# Patient Record
Sex: FEMALE | Race: WHITE | NOT HISPANIC OR LATINO | Employment: FULL TIME | URBAN - METROPOLITAN AREA
[De-identification: names, ages, dates, MRNs, and addresses within clinical notes are randomized per-mention and may not be internally consistent; named-entity substitution may affect disease eponyms.]

---

## 2020-01-22 ENCOUNTER — OFFICE VISIT - HEALTHEAST (OUTPATIENT)
Dept: FAMILY MEDICINE | Facility: CLINIC | Age: 75
End: 2020-01-22

## 2020-01-22 DIAGNOSIS — D68.51 FACTOR V LEIDEN (H): ICD-10-CM
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
## 2020-01-22 DIAGNOSIS — Z23 NEED FOR TETANUS BOOSTER: ICD-10-CM

## 2020-01-22 DIAGNOSIS — R53.82 CHRONIC FATIGUE: ICD-10-CM

## 2020-01-22 DIAGNOSIS — R07.89 ATYPICAL CHEST PAIN: ICD-10-CM

## 2020-01-22 LAB
ALBUMIN SERPL-MCNC: 3.9 G/DL (ref 3.5–5)
ALP SERPL-CCNC: 82 U/L (ref 45–120)
ALT SERPL W P-5'-P-CCNC: 12 U/L (ref 0–45)
ANION GAP SERPL CALCULATED.3IONS-SCNC: 11 MMOL/L (ref 5–18)
AST SERPL W P-5'-P-CCNC: 13 U/L (ref 0–40)
ATRIAL RATE - MUSE: 60 BPM
BASOPHILS # BLD AUTO: 0.1 THOU/UL (ref 0–0.2)
BASOPHILS NFR BLD AUTO: 1 % (ref 0–2)
BILIRUB SERPL-MCNC: 0.3 MG/DL (ref 0–1)
BUN SERPL-MCNC: 39 MG/DL (ref 8–28)
C REACTIVE PROTEIN LHE: 0.3 MG/DL (ref 0–0.8)
CALCIUM SERPL-MCNC: 10 MG/DL (ref 8.5–10.5)
CHLORIDE BLD-SCNC: 106 MMOL/L (ref 98–107)
CK SERPL-CCNC: 48 U/L (ref 30–190)
CO2 SERPL-SCNC: 24 MMOL/L (ref 22–31)
CREAT SERPL-MCNC: 1.35 MG/DL (ref 0.6–1.1)
DIASTOLIC BLOOD PRESSURE - MUSE: NORMAL
EOSINOPHIL # BLD AUTO: 0.2 THOU/UL (ref 0–0.4)
EOSINOPHIL NFR BLD AUTO: 1 % (ref 0–6)
ERYTHROCYTE [DISTWIDTH] IN BLOOD BY AUTOMATED COUNT: 11.4 % (ref 11–14.5)
ERYTHROCYTE [SEDIMENTATION RATE] IN BLOOD BY WESTERGREN METHOD: 12 MM/HR (ref 0–20)
GFR SERPL CREATININE-BSD FRML MDRD: 39 ML/MIN/1.73M2
GLUCOSE BLD-MCNC: 93 MG/DL (ref 70–125)
HCT VFR BLD AUTO: 43.9 % (ref 35–47)
HGB BLD-MCNC: 14.7 G/DL (ref 12–16)
INTERPRETATION ECG - MUSE: NORMAL
LYMPHOCYTES # BLD AUTO: 1.8 THOU/UL (ref 0.8–4.4)
LYMPHOCYTES NFR BLD AUTO: 15 % (ref 20–40)
MCH RBC QN AUTO: 33.8 PG (ref 27–34)
MCHC RBC AUTO-ENTMCNC: 33.5 G/DL (ref 32–36)
MCV RBC AUTO: 101 FL (ref 80–100)
MONOCYTES # BLD AUTO: 0.8 THOU/UL (ref 0–0.9)
MONOCYTES NFR BLD AUTO: 7 % (ref 2–10)
NEUTROPHILS # BLD AUTO: 8.7 THOU/UL (ref 2–7.7)
NEUTROPHILS NFR BLD AUTO: 76 % (ref 50–70)
P AXIS - MUSE: 54 DEGREES
PLATELET # BLD AUTO: 210 THOU/UL (ref 140–440)
PMV BLD AUTO: 8.5 FL (ref 7–10)
POTASSIUM BLD-SCNC: 4.7 MMOL/L (ref 3.5–5)
PR INTERVAL - MUSE: 150 MS
PROT SERPL-MCNC: 7.1 G/DL (ref 6–8)
QRS DURATION - MUSE: 90 MS
QT - MUSE: 412 MS
QTC - MUSE: 412 MS
R AXIS - MUSE: -49 DEGREES
RBC # BLD AUTO: 4.36 MILL/UL (ref 3.8–5.4)
SODIUM SERPL-SCNC: 141 MMOL/L (ref 136–145)
SYSTOLIC BLOOD PRESSURE - MUSE: NORMAL
T AXIS - MUSE: 7 DEGREES
TROPONIN I SERPL-MCNC: <0.01 NG/ML (ref 0–0.29)
TSH SERPL DL<=0.005 MIU/L-ACNC: 0.24 UIU/ML (ref 0.3–5)
VENTRICULAR RATE- MUSE: 60 BPM
WBC: 11.5 THOU/UL (ref 4–11)

## 2020-01-22 RX ORDER — ASPIRIN 325 MG
325 TABLET ORAL
Status: SHIPPED | COMMUNITY
Start: 2013-04-11

## 2020-01-22 RX ORDER — PREDNISONE 10 MG/1
TABLET ORAL
Qty: 42 TABLET | Refills: 0 | Status: SHIPPED | OUTPATIENT
Start: 2020-01-22 | End: 2021-12-03

## 2020-01-22 ASSESSMENT — MIFFLIN-ST. JEOR: SCORE: 1377.71

## 2020-01-23 LAB — B BURGDOR IGG+IGM SER QL: 0.1 INDEX VALUE

## 2020-01-28 ENCOUNTER — COMMUNICATION - HEALTHEAST (OUTPATIENT)
Dept: FAMILY MEDICINE | Facility: CLINIC | Age: 75
End: 2020-01-28

## 2020-07-09 ENCOUNTER — COMMUNICATION - HEALTHEAST (OUTPATIENT)
Dept: FAMILY MEDICINE | Facility: CLINIC | Age: 75
End: 2020-07-09

## 2020-07-29 ENCOUNTER — OFFICE VISIT - HEALTHEAST (OUTPATIENT)
Dept: FAMILY MEDICINE | Facility: CLINIC | Age: 75
End: 2020-07-29

## 2020-07-29 DIAGNOSIS — K46.9 NON-RECURRENT ABDOMINAL HERNIA WITHOUT OBSTRUCTION OR GANGRENE, UNSPECIFIED HERNIA TYPE: ICD-10-CM

## 2020-07-29 RX ORDER — GABAPENTIN 100 MG/1
100 CAPSULE ORAL DAILY PRN
Status: SHIPPED | COMMUNITY
Start: 2020-07-29 | End: 2021-12-03

## 2020-07-29 RX ORDER — MAGNESIUM OXIDE 400 MG/1
400 TABLET ORAL DAILY
Status: SHIPPED | COMMUNITY
Start: 2020-07-29

## 2020-08-03 ENCOUNTER — OFFICE VISIT - HEALTHEAST (OUTPATIENT)
Dept: SURGERY | Facility: CLINIC | Age: 75
End: 2020-08-03

## 2020-08-03 DIAGNOSIS — R10.9 ABDOMINAL WALL PAIN: ICD-10-CM

## 2020-08-04 ENCOUNTER — AMBULATORY - HEALTHEAST (OUTPATIENT)
Dept: SURGERY | Facility: CLINIC | Age: 75
End: 2020-08-04

## 2020-08-04 DIAGNOSIS — K42.9 UMBILICAL HERNIA WITHOUT OBSTRUCTION AND WITHOUT GANGRENE: ICD-10-CM

## 2020-08-12 ENCOUNTER — COMMUNICATION - HEALTHEAST (OUTPATIENT)
Dept: SURGERY | Facility: CLINIC | Age: 75
End: 2020-08-12

## 2020-10-06 ENCOUNTER — AMBULATORY - HEALTHEAST (OUTPATIENT)
Dept: FAMILY MEDICINE | Facility: CLINIC | Age: 75
End: 2020-10-06

## 2020-10-06 DIAGNOSIS — Z12.31 VISIT FOR SCREENING MAMMOGRAM: ICD-10-CM

## 2021-05-27 VITALS — SYSTOLIC BLOOD PRESSURE: 150 MMHG | DIASTOLIC BLOOD PRESSURE: 84 MMHG

## 2021-06-01 ENCOUNTER — RECORDS - HEALTHEAST (OUTPATIENT)
Dept: ADMINISTRATIVE | Facility: CLINIC | Age: 76
End: 2021-06-01

## 2021-06-04 VITALS
WEIGHT: 220.8 LBS | HEART RATE: 64 BPM | SYSTOLIC BLOOD PRESSURE: 166 MMHG | OXYGEN SATURATION: 97 % | DIASTOLIC BLOOD PRESSURE: 94 MMHG | RESPIRATION RATE: 23 BRPM | BODY MASS INDEX: 42.23 KG/M2 | TEMPERATURE: 98.5 F

## 2021-06-04 VITALS
SYSTOLIC BLOOD PRESSURE: 130 MMHG | RESPIRATION RATE: 16 BRPM | DIASTOLIC BLOOD PRESSURE: 72 MMHG | BODY MASS INDEX: 39.18 KG/M2 | HEIGHT: 61 IN | HEART RATE: 65 BPM | WEIGHT: 207.5 LBS

## 2021-06-05 NOTE — PROGRESS NOTES
"ASSESSMENT/PLAN:  1. Insect bite, unspecified site, initial encounter  predniSONE (DELTASONE) 10 mg tablet    Ambulatory referral to Dermatology    Electrocardiogram Perform - Clinic    Lyme Antibody [place]    Comprehensive Metabolic Panel    HM1(CBC and Differential)    C-Reactive Protein    Erythrocyte Sedimentation Rate    Troponin I    HM1 (CBC with Diff)    CK Total    Ambulatory referral to Cardiology   2. Atypical chest pain  Ambulatory referral to Cardiology   3. Need for tetanus booster  Tdap vaccine,  8yo or older,  IM   4. Chronic fatigue  Thyroid Stimulating Hormone (TSH)   5. Factor V Leiden (H)         This is a 73 yo female - new patient to clinic - here for evaluation:  1.  Insect bite = centipede.  She sleeps in a room that has window at ground level.  There is a garden area just outside of her window.  Last spring, she reports waking up in middle of night with \"hard\" (not fuzzy) centipede biting her.  She had multiple bites - was able to identify 2 bite marks in most of these sites (all on the upper extremities).  She self-treated with steroids, antibiotics, antihistamines and after 6 weeks, these spots improved.  They recurred in late summer - same spots as previous and it took another couple weeks to improve.  Currently has third round of lesions - she and her daughter feel that symptoms get worse with each episode.  She has already used some low dose prednisone, antibiotics and antihistamine.  She has done some self-reading and found that these centipedes secrete/inject neurotoxins.  There is suggestion that getting a tetanus shot is somehow protective.  There is also some concern that there can be cardiodepressant properties with this toxin.  Patient notes that she hasn't felt well in last couple days.  We will keep her out of work through this week - and will check labs - give tetanus vaccine -refer to Cardiology - will need short term follow up and general   Return in about 2 weeks (around " 2/5/2020).      Medications Discontinued During This Encounter   Medication Reason     predniSONE (DELTASONE) 5 MG tablet      There are no Patient Instructions on file for this visit.    Chief Complaint:  Chief Complaint   Patient presents with     tetnus shot     bits on hands        HPI:   Roula Gomez is a 74 y.o. female c/o  Centipede bite last spring -   Took about 6 weeks to improve  Then 1-1/2 months again - had flare of symptoms  Then, in last couple weeks - flares -     Same spots, but worse -     Using prednisone - ibuprofen, tylenol - benadryl - allegra    Prednisone 10 mg daily -     Left inner distal forearm, left hand - right PIP had large blister on erythematous base; multiple erythematous well defined         PMH:   Patient Active Problem List    Diagnosis Date Noted     Factor V Leiden (H) 01/22/2020     History reviewed. No pertinent past medical history.  History reviewed. No pertinent surgical history.  Social History     Socioeconomic History     Marital status:      Spouse name: Not on file     Number of children: Not on file     Years of education: Not on file     Highest education level: Not on file   Occupational History     Not on file   Social Needs     Financial resource strain: Not on file     Food insecurity:     Worry: Not on file     Inability: Not on file     Transportation needs:     Medical: Not on file     Non-medical: Not on file   Tobacco Use     Smoking status: Never Smoker     Smokeless tobacco: Never Used   Substance and Sexual Activity     Alcohol use: Never     Frequency: Never     Drug use: Never     Sexual activity: Not Currently   Lifestyle     Physical activity:     Days per week: Not on file     Minutes per session: Not on file     Stress: Not on file   Relationships     Social connections:     Talks on phone: Not on file     Gets together: Not on file     Attends Rastafari service: Not on file     Active member of club or organization: Not on file      "Attends meetings of clubs or organizations: Not on file     Relationship status: Not on file     Intimate partner violence:     Fear of current or ex partner: Not on file     Emotionally abused: Not on file     Physically abused: Not on file     Forced sexual activity: Not on file   Other Topics Concern     Not on file   Social History Narrative     Not on file     History reviewed. No pertinent family history.    Meds:    Current Outpatient Medications:      aspirin 325 MG tablet, Take 325 mg by mouth., Disp: , Rfl:      predniSONE (DELTASONE) 10 mg tablet, 6 tabs po daily x 2 d, 5 tabs po daily x 2 d, 4 tabs po daily x 2 d,  3 tabs po daily x 2 d,  2 tabs po daily x 2 d,  1 tab po daily x 2 d., Disp: 42 tablet, Rfl: 0    Allergies:  Allergies   Allergen Reactions     Sulfa (Sulfonamide Antibiotics)        ROS:  Pertinent positives as noted in HPI; otherwise 12 point ROS negative.      Physical Exam:  EXAM:  /72 (Patient Site: Right Arm, Patient Position: Sitting, Cuff Size: Adult Large)   Pulse 65   Resp 16   Ht 5' 0.63\" (1.54 m)   Wt 207 lb 8 oz (94.1 kg)   BMI 39.69 kg/m     Gen:  NAD, appears well, well-hydrated  HEENT:  TMs nl, oropharynx benign, nasal mucosa nl, conjunctiva clear  Neck:  Supple, no adenopathy, no thyromegaly, no carotid bruits, no JVD  Lungs:  Clear to auscultation bilaterally  Cor:  RRR no murmur  Abd:  Soft, nontender, BS+, no masses, no guarding or rebound, no HSM  Extr:  Neg.  Neuro:  No asymmetry  Skin:  Warm/dry; distinct well marginated erythematous lesions on upper extremities  - largest is at medial left elbow - there are some on the fingers - with blistering lesions right         Results:  Results for orders placed or performed in visit on 01/22/20   Lyme Antibody Cascade   Result Value Ref Range    Lyme Antibody Cascade 0.10 <0.90 Index Value   Comprehensive Metabolic Panel   Result Value Ref Range    Sodium 141 136 - 145 mmol/L    Potassium 4.7 3.5 - 5.0 mmol/L    " Chloride 106 98 - 107 mmol/L    CO2 24 22 - 31 mmol/L    Anion Gap, Calculation 11 5 - 18 mmol/L    Glucose 93 70 - 125 mg/dL    BUN 39 (H) 8 - 28 mg/dL    Creatinine 1.35 (H) 0.60 - 1.10 mg/dL    GFR MDRD Af Amer 47 (L) >60 mL/min/1.73m2    GFR MDRD Non Af Amer 39 (L) >60 mL/min/1.73m2    Bilirubin, Total 0.3 0.0 - 1.0 mg/dL    Calcium 10.0 8.5 - 10.5 mg/dL    Protein, Total 7.1 6.0 - 8.0 g/dL    Albumin 3.9 3.5 - 5.0 g/dL    Alkaline Phosphatase 82 45 - 120 U/L    AST 13 0 - 40 U/L    ALT 12 0 - 45 U/L   C-Reactive Protein   Result Value Ref Range    CRP 0.3 0.0 - 0.8 mg/dL   Erythrocyte Sedimentation Rate   Result Value Ref Range    Sed Rate 12 0 - 20 mm/hr   Troponin I   Result Value Ref Range    Troponin I <0.01 0.00 - 0.29 ng/mL   HM1 (CBC with Diff)   Result Value Ref Range    WBC 11.5 (H) 4.0 - 11.0 thou/uL    RBC 4.36 3.80 - 5.40 mill/uL    Hemoglobin 14.7 12.0 - 16.0 g/dL    Hematocrit 43.9 35.0 - 47.0 %     (H) 80 - 100 fL    MCH 33.8 27.0 - 34.0 pg    MCHC 33.5 32.0 - 36.0 g/dL    RDW 11.4 11.0 - 14.5 %    Platelets 210 140 - 440 thou/uL    MPV 8.5 7.0 - 10.0 fL    Neutrophils % 76 (H) 50 - 70 %    Lymphocytes % 15 (L) 20 - 40 %    Monocytes % 7 2 - 10 %    Eosinophils % 1 0 - 6 %    Basophils % 1 0 - 2 %    Neutrophils Absolute 8.7 (H) 2.0 - 7.7 thou/uL    Lymphocytes Absolute 1.8 0.8 - 4.4 thou/uL    Monocytes Absolute 0.8 0.0 - 0.9 thou/uL    Eosinophils Absolute 0.2 0.0 - 0.4 thou/uL    Basophils Absolute 0.1 0.0 - 0.2 thou/uL   CK Total   Result Value Ref Range    CK, Total 48 30 - 190 U/L   Thyroid Stimulating Hormone (TSH)   Result Value Ref Range    TSH 0.24 (L) 0.30 - 5.00 uIU/mL   Electrocardiogram Perform - Clinic   Result Value Ref Range    SYSTOLIC BLOOD PRESSURE      DIASTOLIC BLOOD PRESSURE      VENTRICULAR RATE 60 BPM    ATRIAL RATE 60 BPM    P-R INTERVAL 150 ms    QRS DURATION 90 ms    Q-T INTERVAL 412 ms    QTC CALCULATION (BEZET) 412 ms    P Axis 54 degrees    R AXIS -49 degrees     T AXIS 7 degrees    MUSE DIAGNOSIS       Normal sinus rhythm  Left axis deviation  Abnormal ECG  No previous ECGs available  Confirmed by NHI CORREA MD LOC: ([id number]) on 1/22/2020 2:35:14 PM                                                                                                                                                                                                                                                            Yes

## 2021-06-09 NOTE — TELEPHONE ENCOUNTER
----- Message from Meek Ring CMA sent at 6/10/2020  8:17 AM CDT -----      ----- Message -----  From: Emanuel Aldana MD  Sent: 6/9/2020  12:56 PM CDT  To: [facility]/Ob Support Pool    Please set up Annual Wellness visit virtual visit.

## 2021-06-10 NOTE — PATIENT INSTRUCTIONS - HE
Patient was educated on the natural course of injury.  I suspect a possible hernia in that area since she has had one for many years in that general location. Conservative measures discussed including rest, ice, and over-the-counter analgesics (Tylenol or Ibuprofen) as needed. Referred to general surgery. Seek emergency care if you develop severe pain/swelling or fever.

## 2021-06-10 NOTE — PROGRESS NOTES
URGENT CARE VISIT:    SUBJECTIVE:   Roula Gomez is a 74 y.o. female who presents for right sided abdominal pain for 5 days. Patient felt a pop when she lifted something. Has a had a hernia in that area for about 5 years which goes in and out intermittently. Has not been seen for it. Pain is sharp and worsening. Worse with movement and better with rest. Treatments tried include Tylenol and Ibuprofen with no of symptoms. Denies n/v, diarrhea, or shortness of breath.     PMH: History reviewed. No pertinent past medical history.  Allergies: Sulfa (sulfonamide antibiotics)  Medications:   Current Outpatient Medications   Medication Sig Dispense Refill     aspirin 325 MG tablet Take 325 mg by mouth.       gabapentin (NEURONTIN) 100 MG capsule Take 100 mg by mouth daily as needed. Takes 100 to 300 mg as needed       magnesium oxide (MAG-OX) 400 mg (241.3 mg magnesium) tablet Take 400 mg by mouth daily.       predniSONE (DELTASONE) 10 mg tablet 6 tabs po daily x 2 d, 5 tabs po daily x 2 d, 4 tabs po daily x 2 d,  3 tabs po daily x 2 d,  2 tabs po daily x 2 d,  1 tab po daily x 2 d. 42 tablet 0     No current facility-administered medications for this visit.      Social History:   Social History     Tobacco Use     Smoking status: Current Every Day Smoker     Types: Cigarettes     Smokeless tobacco: Never Used   Substance Use Topics     Alcohol use: Never     Frequency: Never       ROS: ROS otherwise found to be negative except as noted above.     OBJECTIVE:  BP (!) 166/94 (Patient Site: Left Arm, Patient Position: Sitting, Cuff Size: Adult Regular)   Pulse 64   Temp 98.5  F (36.9  C) (Oral)   Resp 23   Wt 220 lb 12.8 oz (100.2 kg)   SpO2 97%   BMI 42.23 kg/m    General: WDWN in NAD  Eyes: Conjunctiva clear  Neck: Supple, non-tender  Cardiac: RRR without murmurs, rubs, or gallops.  Respiratory: LCTAB without adventitious sounds. Non-labored breathing.  Abdominal: Active bowel sounds in all four quadrants. Moderate  ttp over right anteriolateral abdomen. No visible hernias.  Extremities: No peripheral edema or tenderness, peripheral pulses normal  Neuro: Sensory exam grossly normal,  normal speech and mentation  Integumentary: No suspicious rashes or lesions.     ASSESSMENT:   1. Non-recurrent abdominal hernia without obstruction or gangrene, unspecified hernia type  ketorolac injection 30 mg (TORADOL)    Ambulatory referral to General Surgery   Pain improved with Toradol administration.    PLAN:  Patient Instructions   Patient was educated on the natural course of injury.  I suspect a possible hernia in that area since she has had one for many years in that general location. Conservative measures discussed including rest, ice, and over-the-counter analgesics (Tylenol or Ibuprofen) as needed. Referred to general surgery. Seek emergency care if you develop severe pain/swelling or fever.  Patient verbalized understanding and is agreeable to plan. The patient was discharged ambulatory and in stable condition.    Marilu Lambro ....................  7/29/2020   8:45 AM

## 2021-06-10 NOTE — TELEPHONE ENCOUNTER
Left a voicemail for Roula to see if she would like to schedule her surgery with Dr. Abernathy (Umbilical hernia repair).    Provided direct call back number if she would like to schedule.    Angela GASTON [facility], General Surgery  Surgery Scheduler  [phone] (Direct Line)  [phone] (Main Line)

## 2021-06-16 PROBLEM — D68.51 FACTOR V LEIDEN (H): Status: ACTIVE | Noted: 2020-01-22

## 2021-06-20 NOTE — LETTER
Letter by Isabella Bolden MD at      Author: Isabella Bolden MD Service: -- Author Type: --    Filed:  Encounter Date: 1/22/2020 Status: (Other)         January 22, 2020     Patient: Roula Gomez   YOB: 1945   Date of Visit: 1/22/2020       To Whom it May Concern:    Roula Gomez was seen in my clinic on 1/22/2020.  She will be out of work the remainder of this week - Wednesday through Friday, January 22-24, 2020, due to illness.     If you have any questions or concerns, please don't hesitate to call.    Sincerely,         Electronically signed by Isabella Bolden MD

## 2021-06-20 NOTE — LETTER
Letter by Isabella Bolden MD at      Author: Isabella Bolden MD Service: -- Author Type: --    Filed:  Encounter Date: 1/28/2020 Status: (Other)         Roula Gomez  [address]  [place] [address]             January 28, 2020         Dear Ms. Gomez,    Below are the results from your recent visit:    Resulted Orders   Lyme Antibody Cascade   Result Value Ref Range    Lyme Antibody Cascade 0.10 <0.90 Index Value    Narrative    Interpretation of Lyme Disease Total Antibody (IgG/IgM)  <0.90 Test Value=Negative  No detectable antibodies to B. burgdorferi. Patients  in early stages of infection may not produce  detectable levels of antibody. Antibiotic therapy  in early disease may prevent antibody production  from reaching detectable levels. Patients with  clinical history and/or symptoms suggestive of Lyme  disease but with negative test results should be  retested in 2-4 weeks.  0.90-<1.10 Test Value=Borderline  Suggests the presence of antibodies to B.  burgdorferi. Recommend repeat collection in 2-4  weeks.  >=1.10 Test Value=Positive  Indicates the presence of antibodies to B.  burgdorferi. False positive results can occur with  sera from syphilis patients. Cross-reactivity may  occur with relapsing fever, [place] Spotted  fever, other spirochetal diseases, erythematosus,  EBV infection, or CMV infection. Clinical symptoms,  epidemiology of the case and other laboratory tests  should allow for distinction of these conditions from  Lyme disease.   Comprehensive Metabolic Panel   Result Value Ref Range    Sodium 141 136 - 145 mmol/L    Potassium 4.7 3.5 - 5.0 mmol/L    Chloride 106 98 - 107 mmol/L    CO2 24 22 - 31 mmol/L    Anion Gap, Calculation 11 5 - 18 mmol/L    Glucose 93 70 - 125 mg/dL    BUN 39 (H) 8 - 28 mg/dL    Creatinine 1.35 (H) 0.60 - 1.10 mg/dL    GFR MDRD Af Amer 47 (L) >60 mL/min/1.73m2    GFR MDRD Non Af Amer 39 (L) >60 mL/min/1.73m2    Bilirubin, Total 0.3  0.0 - 1.0 mg/dL    Calcium 10.0 8.5 - 10.5 mg/dL    Protein, Total 7.1 6.0 - 8.0 g/dL    Albumin 3.9 3.5 - 5.0 g/dL    Alkaline Phosphatase 82 45 - 120 U/L    AST 13 0 - 40 U/L    ALT 12 0 - 45 U/L    Narrative    Fasting Glucose reference range is 70-99 mg/dL per  American Diabetes Association (ADA) guidelines.   C-Reactive Protein   Result Value Ref Range    CRP 0.3 0.0 - 0.8 mg/dL   Erythrocyte Sedimentation Rate   Result Value Ref Range    Sed Rate 12 0 - 20 mm/hr   Troponin I   Result Value Ref Range    Troponin I <0.01 0.00 - 0.29 ng/mL   HM1 (CBC with Diff)   Result Value Ref Range    WBC 11.5 (H) 4.0 - 11.0 thou/uL    RBC 4.36 3.80 - 5.40 mill/uL    Hemoglobin 14.7 12.0 - 16.0 g/dL    Hematocrit 43.9 35.0 - 47.0 %     (H) 80 - 100 fL    MCH 33.8 27.0 - 34.0 pg    MCHC 33.5 32.0 - 36.0 g/dL    RDW 11.4 11.0 - 14.5 %    Platelets 210 140 - 440 thou/uL    MPV 8.5 7.0 - 10.0 fL    Neutrophils % 76 (H) 50 - 70 %    Lymphocytes % 15 (L) 20 - 40 %    Monocytes % 7 2 - 10 %    Eosinophils % 1 0 - 6 %    Basophils % 1 0 - 2 %    Neutrophils Absolute 8.7 (H) 2.0 - 7.7 thou/uL    Lymphocytes Absolute 1.8 0.8 - 4.4 thou/uL    Monocytes Absolute 0.8 0.0 - 0.9 thou/uL    Eosinophils Absolute 0.2 0.0 - 0.4 thou/uL    Basophils Absolute 0.1 0.0 - 0.2 thou/uL   CK Total   Result Value Ref Range    CK, Total 48 30 - 190 U/L   Thyroid Stimulating Hormone (TSH)   Result Value Ref Range    TSH 0.24 (L) 0.30 - 5.00 uIU/mL       Your labs don't give up too many clues (I guess that's good).  Your TSH (thyroid) test would suggest minimal hyperthyroidism - something we should just follow.  Your Creatinine suggests slightly lower kidney function than we'd expect.  We should recheck that as well.  Hopefully you are feeling better?!    Please call with questions or contact us using [employer].    Sincerely,        Electronically signed by Isabella Bolden MD

## 2021-06-20 NOTE — LETTER
Letter by Marilu Driver PA-C at      Author: Marilu Driver PA-C Service: -- Author Type: --    Filed:  Encounter Date: 7/29/2020 Status: (Other)         July 29, 2020     Patient: Roula Gomez   YOB: 1945   Date of Visit: 7/29/2020       To Whom it May Concern:    Roula Gomez was seen in my clinic on 7/29/2020.    If you have any questions or concerns, please don't hesitate to call.    Sincerely,         Electronically signed by Marilu Driver PA-C

## 2021-06-20 NOTE — LETTER
Letter by Isabella Bolden MD at      Author: Isabella Bolden MD Service: -- Author Type: --    Filed:  Encounter Date: 1/28/2020 Status: (Other)         Roula Gomez  [address]  [place] [address]             January 28, 2020         Dear Ms. Gomez,    Below are the results from your recent visit:    Resulted Orders   Electrocardiogram Perform - Clinic   Result Value Ref Range    SYSTOLIC BLOOD PRESSURE      DIASTOLIC BLOOD PRESSURE      VENTRICULAR RATE 60 BPM    ATRIAL RATE 60 BPM    P-R INTERVAL 150 ms    QRS DURATION 90 ms    Q-T INTERVAL 412 ms    QTC CALCULATION (BEZET) 412 ms    P Axis 54 degrees    R AXIS -49 degrees    T AXIS 7 degrees    MUSE DIAGNOSIS       Normal sinus rhythm  Left axis deviation  Abnormal ECG  No previous ECGs available  Confirmed by NHI CORREA MD LOC:[facility] ([id number]) on 1/22/2020 2:35:14 PM       Nothing too exciting here!  Did you get an appointment with cardiology?    Please call with questions or contact us using [employer].    Sincerely,        Electronically signed by Isabella Bolden MD

## 2021-06-29 NOTE — PROGRESS NOTES
Progress Notes by Burt Abernathy MD at 8/3/2020 10:15 AM     Author: Burt Abernathy MD Service: -- Author Type: Physician    Filed: 8/3/2020  1:13 PM Encounter Date: 8/3/2020 Status: Signed    : Burt Abernathy MD (Physician)           [facility] Surgery Consult    HPI:    74 y.o. year old female who I have been consulted by Isabella Bolden MD for evaluation of Hernia (Consult for abd hernia, present for many years. Just recently started to cause pain. )   Pain a few days ago with lifting. Questionable hernia. 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     Allergies:Sulfa (sulfonamide antibiotics)    Past Medical History:   Diagnosis Date   ? Clotting disorder (H)     Factor V       Past Surgical History:   Procedure Laterality Date   ? APPENDECTOMY     ? CHOLECYSTECTOMY     ? COLONOSCOPY     ? LITHOTRIPSY      x2   ? OVARIAN CYST REMOVAL     ? TONSILLECTOMY     ? TUBAL LIGATION         CURRENT MEDS:  Current Outpatient Medications on File Prior to Visit   Medication Sig Dispense Refill   ? aspirin 325 MG tablet Take 325 mg by mouth.     ? gabapentin (NEURONTIN) 100 MG capsule Take 100 mg by mouth daily as needed. Takes 100 to 300 mg as needed     ? magnesium oxide (MAG-OX) 400 mg (241.3 mg magnesium) tablet Take 400 mg by mouth daily.     ? predniSONE (DELTASONE) 10 mg tablet 6 tabs po daily x 2 d, 5 tabs po daily x 2 d, 4 tabs po daily x 2 d,  3 tabs po daily x 2 d,  2 tabs po daily x 2 d,  1 tab po daily x 2 d. 42 tablet 0     No current facility-administered medications on file prior to visit.        Family History   Problem Relation Age of Onset   ? Breast cancer Mother    ? Colon cancer Father         reports that she has been smoking cigarettes. She has been smoking about 0.50 packs per day. She has never used smokeless tobacco. She reports that she does not drink alcohol or use drugs.    Review of Systems:  Negative except right upper  abdomen pain. No nausea or vomiting or fevers, chills diarrhea. Otherwise twelve system of review is negative.      OBJECTIVE:  Vitals:    08/03/20 1046   BP: 150/84   Patient Site: Right Arm   Patient Position: Sitting   Cuff Size: Adult Large     There is no height or weight on file to calculate BMI.    EXAM:  GENERAL: This is a well-developed 74 y.o. female who appears her stated age  HEAD: normocephalic  HEENT: Pupils equal and reactive bilaterally  MOUTH: mucus membranes intact  CARDIAC: RRR without murmur  CHEST/LUNG:  Clear to auscultation  ABDOMEN: Soft, tender right upper to deep palpation, nondistended, no masses  EXTREMITIES: Grossly normal, warm,   NEUROLOGIC: Focally intact, nonfocal  INTEGUMENT: No open lesions or ulcers  VASCULAR: Pulses intact, symmetrical upper and lower extremities.        LABS:  Lab Results   Component Value Date    WBC 11.5 (H) 01/22/2020    HGB 14.7 01/22/2020    HCT 43.9 01/22/2020     (H) 01/22/2020     01/22/2020     INR/Prothrombin Time      No results found for: HGBA1C  Lab Results   Component Value Date    ALT 12 01/22/2020    AST 13 01/22/2020    ALKPHOS 82 01/22/2020    BILITOT 0.3 01/22/2020        Images: pending    Assessment/Plan:   1. Abdominal wall pain  Pain right upper abdomen. Not a palpable defect but thicker wall. Obesity  Will check ct of abdomen and pelvis  Repair if needed.   High risks with morbid obesity and lifestyle.   Discussed possible robotic repair    - CT Abdomen Without Oral With IV Contrast; Future  - CT Abdomen Without Oral With IV Contrast      No follow-ups on file.    Burt Abernathy MD  General Surgery [phone]  Vascular Surgery [phone]

## 2021-12-03 ENCOUNTER — OFFICE VISIT (OUTPATIENT)
Dept: FAMILY MEDICINE | Facility: CLINIC | Age: 76
End: 2021-12-03
Payer: COMMERCIAL

## 2021-12-03 VITALS
WEIGHT: 199 LBS | DIASTOLIC BLOOD PRESSURE: 80 MMHG | OXYGEN SATURATION: 100 % | SYSTOLIC BLOOD PRESSURE: 143 MMHG | HEART RATE: 56 BPM | BODY MASS INDEX: 37.57 KG/M2 | TEMPERATURE: 98.1 F | HEIGHT: 61 IN

## 2021-12-03 DIAGNOSIS — Z01.818 PREOP EXAMINATION: Primary | ICD-10-CM

## 2021-12-03 DIAGNOSIS — H25.9 AGE-RELATED CATARACT OF BOTH EYES, UNSPECIFIED AGE-RELATED CATARACT TYPE: ICD-10-CM

## 2021-12-03 DIAGNOSIS — R03.0 ELEVATED BLOOD PRESSURE READING WITHOUT DIAGNOSIS OF HYPERTENSION: ICD-10-CM
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
## 2021-12-03 DIAGNOSIS — G89.29 OTHER CHRONIC PAIN: ICD-10-CM

## 2021-12-03 DIAGNOSIS — L40.9 PSORIASIS: ICD-10-CM

## 2021-12-03 LAB
ALBUMIN SERPL-MCNC: 3.6 G/DL (ref 3.5–5)
ALP SERPL-CCNC: 97 U/L (ref 45–120)
ALT SERPL W P-5'-P-CCNC: 11 U/L (ref 0–45)
ANION GAP SERPL CALCULATED.3IONS-SCNC: 11 MMOL/L (ref 5–18)
AST SERPL W P-5'-P-CCNC: 14 U/L (ref 0–40)
BILIRUB SERPL-MCNC: 0.7 MG/DL (ref 0–1)
BUN SERPL-MCNC: 20 MG/DL (ref 8–28)
CALCIUM SERPL-MCNC: 9.6 MG/DL (ref 8.5–10.5)
CHLORIDE BLD-SCNC: 108 MMOL/L (ref 98–107)
CO2 SERPL-SCNC: 23 MMOL/L (ref 22–31)
CREAT SERPL-MCNC: 0.8 MG/DL (ref 0.6–1.1)
ERYTHROCYTE [DISTWIDTH] IN BLOOD BY AUTOMATED COUNT: 12.9 % (ref 10–15)
GFR SERPL CREATININE-BSD FRML MDRD: 73 ML/MIN/1.73M2
GLUCOSE BLD-MCNC: 100 MG/DL (ref 70–125)
HCT VFR BLD AUTO: 47 % (ref 35–47)
HGB BLD-MCNC: 15.3 G/DL (ref 11.7–15.7)
MCH RBC QN AUTO: 33.1 PG (ref 26.5–33)
MCHC RBC AUTO-ENTMCNC: 32.6 G/DL (ref 31.5–36.5)
MCV RBC AUTO: 102 FL (ref 78–100)
PLATELET # BLD AUTO: 214 10E3/UL (ref 150–450)
POTASSIUM BLD-SCNC: 4.4 MMOL/L (ref 3.5–5)
PROT SERPL-MCNC: 7.1 G/DL (ref 6–8)
RBC # BLD AUTO: 4.62 10E6/UL (ref 3.8–5.2)
SODIUM SERPL-SCNC: 142 MMOL/L (ref 136–145)
WBC # BLD AUTO: 7.9 10E3/UL (ref 4–11)

## 2021-12-03 PROCEDURE — 85027 COMPLETE CBC AUTOMATED: CPT | Performed by: FAMILY MEDICINE

## 2021-12-03 PROCEDURE — 36415 COLL VENOUS BLD VENIPUNCTURE: CPT | Performed by: FAMILY MEDICINE

## 2021-12-03 PROCEDURE — 99214 OFFICE O/P EST MOD 30 MIN: CPT | Performed by: FAMILY MEDICINE

## 2021-12-03 PROCEDURE — 80053 COMPREHEN METABOLIC PANEL: CPT | Performed by: FAMILY MEDICINE

## 2021-12-03 RX ORDER — PREDNISONE 10 MG/1
TABLET ORAL
Qty: 42 TABLET | Refills: 0 | Status: SHIPPED | OUTPATIENT
Start: 2021-12-03 | End: 2022-03-20

## 2021-12-03 RX ORDER — GABAPENTIN 300 MG/1
300 CAPSULE ORAL DAILY
Qty: 90 CAPSULE | Refills: 1 | Status: SHIPPED | OUTPATIENT
Start: 2021-12-03 | End: 2022-05-17

## 2021-12-03 RX ORDER — KETOROLAC TROMETHAMINE 5 MG/ML
SOLUTION OPHTHALMIC
COMMUNITY
Start: 2021-11-27 | End: 2023-10-17

## 2021-12-03 RX ORDER — PREDNISOLONE ACETATE 10 MG/ML
SUSPENSION/ DROPS OPHTHALMIC
COMMUNITY
Start: 2021-11-27 | End: 2023-10-17

## 2021-12-03 RX ORDER — OFLOXACIN 3 MG/ML
SOLUTION/ DROPS OPHTHALMIC
COMMUNITY
Start: 2021-11-29 | End: 2023-10-17

## 2021-12-03 ASSESSMENT — MIFFLIN-ST. JEOR: SCORE: 1345.42

## 2021-12-03 NOTE — LETTER
December 6, 2021      Roula Gomez  [address]  [place] [address]        Dear ,    We are writing to inform you of your test results.    Labs look okay - blood sugar is normal.  No anemia.      Resulted Orders   Comprehensive metabolic panel (BMP + Alb, Alk Phos, ALT, AST, Total. Bili, TP)   Result Value Ref Range    Sodium 142 136 - 145 mmol/L    Potassium 4.4 3.5 - 5.0 mmol/L    Chloride 108 (H) 98 - 107 mmol/L    Carbon Dioxide (CO2) 23 22 - 31 mmol/L    Anion Gap 11 5 - 18 mmol/L    Urea Nitrogen 20 8 - 28 mg/dL    Creatinine 0.80 0.60 - 1.10 mg/dL    Calcium 9.6 8.5 - 10.5 mg/dL    Glucose 100 70 - 125 mg/dL    Alkaline Phosphatase 97 45 - 120 U/L    AST 14 0 - 40 U/L    ALT 11 0 - 45 U/L    Protein Total 7.1 6.0 - 8.0 g/dL    Albumin 3.6 3.5 - 5.0 g/dL    Bilirubin Total 0.7 0.0 - 1.0 mg/dL    GFR Estimate 73 >60 mL/min/1.73m2      Comment:      As of July 11, 2021, eGFR is calculated by the CKD-EPI creatinine equation, without race adjustment. eGFR can be influenced by muscle mass, exercise, and diet. The reported eGFR is an estimation only and is only applicable if the renal function is stable.   CBC with platelets   Result Value Ref Range    WBC Count 7.9 4.0 - 11.0 10e3/uL    RBC Count 4.62 3.80 - 5.20 10e6/uL    Hemoglobin 15.3 11.7 - 15.7 g/dL    Hematocrit 47.0 35.0 - 47.0 %     (H) 78 - 100 fL    MCH 33.1 (H) 26.5 - 33.0 pg    MCHC 32.6 31.5 - 36.5 g/dL    RDW 12.9 10.0 - 15.0 %    Platelet Count 214 150 - 450 10e3/uL       If you have any questions or concerns, please call the clinic at the number listed above.       Sincerely,      Isabella Bolden MD

## 2021-12-03 NOTE — PROGRESS NOTES
[facility] [address] [place] [address]  Phone: [phone]  Fax: [phone]  Primary Provider: Isabella Bolden  Pre-op Performing Provider: ISABELLA BOLDEN      PREOPERATIVE EVALUATION:  Today's date: 12/3/2021    Roula Gomez is a 76 year old female who presents for a preoperative evaluation.    Surgical Information:  Surgery/Procedure: Cataract Surgery- Right eye, then left eye  Surgery Location: [facility]  Surgeon: Dr. Haque  Surgery Date: 12/23/21, 1/6/21(?)  Time of Surgery: unknown  Where patient plans to recover: At home with family  Fax number for surgical facility: unknown     Type of Anesthesia Anticipated: to be determined    Assessment & Plan     The proposed surgical procedure is considered LOW risk.    Problem List Items Addressed This Visit     None      Visit Diagnoses     Preop examination    -  Primary    Relevant Orders    Comprehensive metabolic panel (BMP + Alb, Alk Phos, ALT, AST, Total. Bili, TP) (Completed)    CBC with platelets (Completed)    Age-related cataract of both eyes, unspecified age-related cataract type        Insect bite, unspecified site, initial encounter        Relevant Medications    predniSONE (DELTASONE) 10 MG tablet    Elevated blood pressure reading without diagnosis of hypertension        Relevant Orders    Comprehensive metabolic panel (BMP + Alb, Alk Phos, ALT, AST, Total. Bili, TP) (Completed)    Other chronic pain        Relevant Medications    gabapentin (NEURONTIN) 300 MG capsule    Psoriasis        Relevant Medications    ketorolac (ACULAR) 0.5 % ophthalmic solution    ofloxacin (OCUFLOX) 0.3 % ophthalmic solution    prednisoLONE acetate (PRED FORTE) 1 % ophthalmic suspension    predniSONE (DELTASONE) 10 MG tablet    clobetasol (TEMOVATE) 0.05 % external cream        This is a 75 yo female - with declining vision related to cataracts - here for preop assessment .  Low risk  "surgery - okay for surgery.      Has rash on posterior neck consistent with psoriasis.  Will treat with topical steroid.         Risks and Recommendations:  The patient has the following additional risks and recommendations for perioperative complications:   - No identified additional risk factors other than previously addressed    Medication Instructions:  Patient is to take all scheduled medications on the day of surgery    RECOMMENDATION:  APPROVAL GIVEN to proceed with proposed procedure, without further diagnostic evaluation.    Review of external notes as documented above       Subjective     HPI related to upcoming procedure: has been \"running into things\" - can see, but \"not good\" - has dense cataracts - scheduled for cataracts      Preop Questions 12/3/2021   1. Have you ever had a heart attack or stroke? No   2. Have you ever had surgery on your heart or blood vessels, such as a stent placement, a coronary artery bypass, or surgery on an artery in your head, neck, heart, or legs? No   3. Do you have chest pain with activity? No   4. Do you have a history of  heart failure? No   5. Do you currently have a cold, bronchitis or symptoms of other infection? No   6. Do you have a cough, shortness of breath, or wheezing? No   7. Do you or anyone in your family have previous history of blood clots? YES - Factor V Leiden   8. Do you or does anyone in your family have a serious bleeding problem such as prolonged bleeding following surgeries or cuts? No   9. Have you ever had problems with anemia or been told to take iron pills? No   10. Have you had any abnormal blood loss such as black, tarry or bloody stools, or abnormal vaginal bleeding? No   11. Have you ever had a blood transfusion? No   12. Are you willing to have a blood transfusion if it is medically needed before, during, or after your surgery? Yes   13. Have you or any of your relatives ever had problems with anesthesia? No   14. Do you have sleep apnea, " excessive snoring or daytime drowsiness? No   15. Do you have any artifical heart valves or other implanted medical devices like a pacemaker, defibrillator, or continuous glucose monitor? No   16. Do you have artificial joints? No   17. Are you allergic to latex? No       Health Care Directive:  Patient does not have a Health Care Directive or Living Will: no advanced care documents    Preoperative Review of :   reviewed - no record of controlled substances prescribed.      Status of Chronic Conditions:  See problem list for active medical problems.  Problems all longstanding and stable, except as noted/documented.  See ROS for pertinent symptoms related to these conditions.      Review of Systems  CONSTITUTIONAL: NEGATIVE for fever, chills, change in weight  ENT/MOUTH: NEGATIVE for ear, mouth and throat problems  RESP: NEGATIVE for significant cough or SOB  CV: NEGATIVE for chest pain, palpitations or peripheral edema    Patient Active Problem List    Diagnosis Date Noted     Factor V Leiden (H) 01/22/2020     Priority: Medium      Past Medical History:   Diagnosis Date     Clotting disorder (H)     Factor V     Past Surgical History:   Procedure Laterality Date     APPENDECTOMY       CHOLECYSTECTOMY       COLONOSCOPY       LITHOTRIPSY      x2     OVARIAN CYST REMOVAL       TONSILLECTOMY       TUBAL LIGATION       Current Outpatient Medications   Medication Sig Dispense Refill     aspirin 325 MG tablet [ASPIRIN 325 MG TABLET] Take 325 mg by mouth.       clobetasol (TEMOVATE) 0.05 % external cream Apply topically 2 times daily Apply sparingly BID to affected areas only. 45 g 0     gabapentin (NEURONTIN) 300 MG capsule Take 1 capsule (300 mg) by mouth daily 90 capsule 1     ketorolac (ACULAR) 0.5 % ophthalmic solution INSTILL 1 DROP FOUR TIMES DAILY TO RIGHT EYE. BEGIN 1 DAY AFTER SURGERY AND USE TIL GONE       magnesium oxide (MAG-OX) 400 mg (241.3 mg magnesium) tablet [MAGNESIUM OXIDE (MAG-OX) 400 MG (241.3  "MG MAGNESIUM) TABLET] Take 400 mg by mouth daily.       ofloxacin (OCUFLOX) 0.3 % ophthalmic solution INSTILL 1 DROP IN RIGHT EYE FOUR TIMES DAILY STARTING 1 DAY BEFORE SURGERY AND USE UNTIL GONE       prednisoLONE acetate (PRED FORTE) 1 % ophthalmic suspension SHAKE LIQUID AND INSTILL 1 DROP IN LEFT EYE FOUR TIMES DAILY. BEGIN DAY BEFORE SURGERY AND USE UNTIL GONE       predniSONE (DELTASONE) 10 MG tablet 6 tabs po daily x 2 d, 5 tabs po daily x 2 d, 4 tabs po daily x 2 d,  3 tabs po daily x 2 d,  2 tabs po daily x 2 d,  1 tab po daily x 2 d. 42 tablet 0       Allergies   Allergen Reactions     Sulfa (Sulfonamide Antibiotics) [Sulfa Drugs] Unknown        Social History     Tobacco Use     Smoking status: Current Every Day Smoker     Packs/day: 0.50     Types: Cigarettes, Cigarettes     Smokeless tobacco: Never Used   Substance Use Topics     Alcohol use: Never     Family History   Problem Relation Age of Onset     Breast Cancer Mother      Colon Cancer Father      History   Drug Use Unknown         Objective     BP (!) 143/80   Pulse 56   Temp 98.1  F (36.7  C)   Ht 1.558 m (5' 1.34\")   Wt 90.3 kg (199 lb)   SpO2 100%   BMI 37.19 kg/m      Physical Exam    GENERAL APPEARANCE: healthy, alert and no distress     EYES: EOMI, PERRL     HENT: ear canals and TM's normal and nose and mouth without ulcers or lesions     NECK: no adenopathy, no asymmetry, masses, or scars and thyroid normal to palpation     RESP: lungs clear to auscultation - no rales, rhonchi or wheezes     CV: regular rates and rhythm, normal S1 S2, no S3 or S4 and no murmur, click or rub     ABDOMEN:  soft, nontender, no HSM or masses and bowel sounds normal     MS: extremities normal- no gross deformities noted, no evidence of inflammation in joints, FROM in all extremities.     SKIN: no suspicious lesions or rashes     SKIN: scaly pruritic rash posterior neck       NEURO: Normal strength and tone, sensory exam grossly normal, mentation intact and " speech normal     PSYCH: mentation appears normal. and affect normal/bright     LYMPHATICS: No cervical adenopathy    Recent Labs   Lab Test 01/22/20  1451   HGB 14.7         POTASSIUM 4.7   CR 1.35*        Diagnostics:  Recent Results (from the past 168 hour(s))   Comprehensive metabolic panel (BMP + Alb, Alk Phos, ALT, AST, Total. Bili, TP)    Collection Time: 12/03/21 11:26 AM   Result Value Ref Range    Sodium 142 136 - 145 mmol/L    Potassium 4.4 3.5 - 5.0 mmol/L    Chloride 108 (H) 98 - 107 mmol/L    Carbon Dioxide (CO2) 23 22 - 31 mmol/L    Anion Gap 11 5 - 18 mmol/L    Urea Nitrogen 20 8 - 28 mg/dL    Creatinine 0.80 0.60 - 1.10 mg/dL    Calcium 9.6 8.5 - 10.5 mg/dL    Glucose 100 70 - 125 mg/dL    Alkaline Phosphatase 97 45 - 120 U/L    AST 14 0 - 40 U/L    ALT 11 0 - 45 U/L    Protein Total 7.1 6.0 - 8.0 g/dL    Albumin 3.6 3.5 - 5.0 g/dL    Bilirubin Total 0.7 0.0 - 1.0 mg/dL    GFR Estimate 73 >60 mL/min/1.73m2   CBC with platelets    Collection Time: 12/03/21 11:26 AM   Result Value Ref Range    WBC Count 7.9 4.0 - 11.0 10e3/uL    RBC Count 4.62 3.80 - 5.20 10e6/uL    Hemoglobin 15.3 11.7 - 15.7 g/dL    Hematocrit 47.0 35.0 - 47.0 %     (H) 78 - 100 fL    MCH 33.1 (H) 26.5 - 33.0 pg    MCHC 32.6 31.5 - 36.5 g/dL    RDW 12.9 10.0 - 15.0 %    Platelet Count 214 150 - 450 10e3/uL      No EKG required for low risk surgery (cataract, skin procedure, breast biopsy, etc).    Revised Cardiac Risk Index (RCRI):  The patient has the following serious cardiovascular risks for perioperative complications:   - No serious cardiac risks = 0 points     RCRI Interpretation: 0 points: Class I (very low risk - 0.4% complication rate)           Signed Electronically by: BECKI BREWER MD  Copy of this evaluation report is provided to requesting physician.

## 2021-12-05 RX ORDER — CLOBETASOL PROPIONATE 0.5 MG/G
CREAM TOPICAL 2 TIMES DAILY
Qty: 45 G | Refills: 0 | Status: SHIPPED | OUTPATIENT
Start: 2021-12-05 | End: 2023-10-17
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
## 2022-03-18 NOTE — TELEPHONE ENCOUNTER
Reason for Call:  Medication or medication refill:    Do you use a [facility]?  Name of the pharmacy and phone number for the current request:  [facility] [place]    Name of the medication requested: predniSONE (DELTASONE) 10 MG tablet    Other request: Patient called to request a refill of this medication. Please send refills to pharmacy on file.    Can we leave a detailed message on this number? YES    Phone number patient can be reached at: Home number on file [phone] (home)    Best Time: any    Call taken on 3/18/2022 at 2:31 PM by Chris Lomeli

## 2022-03-20 RX ORDER — PREDNISONE 10 MG/1
TABLET ORAL
Qty: 42 TABLET | Refills: 0 | Status: SHIPPED | OUTPATIENT
Start: 2022-03-20 | End: 2022-05-17
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
## 2022-05-17 DIAGNOSIS — G89.29 OTHER CHRONIC PAIN: ICD-10-CM

## 2022-05-17 RX ORDER — GABAPENTIN 300 MG/1
300 CAPSULE ORAL DAILY
Qty: 90 CAPSULE | Refills: 1 | Status: SHIPPED | OUTPATIENT
Start: 2022-05-17 | End: 2022-11-15

## 2022-05-17 RX ORDER — PREDNISONE 10 MG/1
TABLET ORAL
Qty: 42 TABLET | Refills: 0 | Status: SHIPPED | OUTPATIENT
Start: 2022-05-17 | End: 2022-07-26

## 2022-05-17 NOTE — TELEPHONE ENCOUNTER
Reason for Call:  Medication or medication refill:    Do you use a [facility]?  Name of the pharmacy and phone number for the [facility] request:  [facility] [place]    Name of the medication requested: Prednisone and Gabapentin    Other request: pt lost her prednisone while visiting her daughter, needs refills ASAP/    Can we leave a detailed message on this number? YES    Phone number patient can be reached at: Home number on file [phone] (home)    Best Time: Any    Call taken on 5/17/2022 at 2:10 PM by Gwen Bates
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
## 2022-07-23 NOTE — TELEPHONE ENCOUNTER
Routing refill request to provider for review/approval because:  Drug not on the [facility] refill protocol - needs new Rx     Last Written Prescription Date:  22  Last Fill Quantity: 42,  # refills: 0   Last office visit provider:  12/3/21    Pt reports she had a flare up 3 weeks ago. No flare up today at the time of call, pt calling to request new prescription    Pt also requests refill for gabapentin., FNA advised she still has 1 refill left at the pharmacy.    FNA advised that refills take 3 business days to complete and are called during clinic hours. Pt verbalized understanding.    Requested Prescriptions   Pending Prescriptions Disp Refills     predniSONE (DELTASONE) 10 MG tablet 42 tablet 0     Si tabs po daily x 2 d, 5 tabs po daily x 2 d, 4 tabs po daily x 2 d,  3 tabs po daily x 2 d,  2 tabs po daily x 2 d,  1 tab po daily x 2 d.       There is no refill protocol information for this order          Lydia Boyd RN 22 12:05 PM

## 2022-07-26 RX ORDER — PREDNISONE 10 MG/1
TABLET ORAL
Qty: 42 TABLET | Refills: 0 | Status: SHIPPED | OUTPATIENT
Start: 2022-07-26 | End: 2022-11-15
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
## 2022-11-14 DIAGNOSIS — G89.29 OTHER CHRONIC PAIN: ICD-10-CM

## 2022-11-14 NOTE — TELEPHONE ENCOUNTER
Medication Question or Refill        What medication are you calling about (include dose and sig)?:   gabapentin (NEURONTIN) 300 MG capsule  predniSONE (DELTASONE) 10 MG tablet    Controlled Substance Agreement on file:   CSA -- Patient Level:    CSA: None found at the patient level.       Who prescribed the medication?: PCP    Do you need a refill? Yes:     When did you use the medication last? NA    Patient offered an appointment? No    Do you have any questions or concerns?  No    Preferred Pharmacy:   [id number] [address] [address]  Phone: [phone] Fax: [phone]      Okay to leave a detailed message?: Yes at Home number on file [phone] (home)

## 2022-11-15 RX ORDER — PREDNISONE 10 MG/1
TABLET ORAL
Qty: 42 TABLET | Refills: 0 | Status: SHIPPED | OUTPATIENT
Start: 2022-11-15 | End: 2023-08-17

## 2022-11-15 RX ORDER — GABAPENTIN 300 MG/1
300 CAPSULE ORAL DAILY
Qty: 90 CAPSULE | Refills: 1 | Status: SHIPPED | OUTPATIENT
Start: 2022-11-15 | End: 2023-08-17

## 2022-11-22 DIAGNOSIS — G89.29 OTHER CHRONIC PAIN: ICD-10-CM

## 2022-11-23 RX ORDER — GABAPENTIN 300 MG/1
CAPSULE ORAL
Qty: 90 CAPSULE | Refills: 1 | OUTPATIENT
Start: 2022-11-23
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
## 2023-08-17 DIAGNOSIS — G89.29 OTHER CHRONIC PAIN: ICD-10-CM

## 2023-08-17 NOTE — TELEPHONE ENCOUNTER
Routing refill request to provider for review/approval because:  Drug not on the [facility] refill protocol     Last Written Prescription Date:  22  Last Fill Quantity: 42,  # refills: 0   Last office visit provider:  12/3/21     Last Written Prescription Date:  11/15/22  Last Fill Quantity: 90,  # refills: 1   Last office visit provider:  12/3/21     Requested Prescriptions   Pending Prescriptions Disp Refills    predniSONE (DELTASONE) 10 MG tablet 42 tablet 0     Si tabs po daily x 2 d, 5 tabs po daily x 2 d, 4 tabs po daily x 2 d,  3 tabs po daily x 2 d,  2 tabs po daily x 2 d,  1 tab po daily x 2 d.       There is no refill protocol information for this order       gabapentin (NEURONTIN) 300 MG capsule 90 capsule 1     Sig: Take 1 capsule (300 mg) by mouth daily       There is no refill protocol information for this order          Judi Llanos RN 23 1:13 PM

## 2023-08-17 NOTE — TELEPHONE ENCOUNTER
Medication Question or Refill    Contacts         Type Contact Phone/Fax    08/17/2023 01:09 PM CDT Phone (Incoming) MauricecarolsRuslandianelys KAMARA (Self) [phone] (M)            What medication are you calling about (include dose and sig)?: gabapentin (NEURONTIN) 300 MG capsule  predniSONE (DELTASONE) 10 MG tablet     Preferred Pharmacy:  [facility] [id number] [place] [address]  Phone: [phone] Fax: [phone]      Controlled Substance Agreement on file:   CSA -- Patient Level:    CSA: None found at the patient level.       Who prescribed the medication?: [place]    Do you need a refill? Yes    When did you use the medication last? N/A    Patient offered an appointment? No    Do you have any questions or concerns?  Yes: patient stated she's unable to come to the clinic at the moment for an appt, but she really need the prednisone because she's having a bad flare up  Okay to leave a detailed message?: Yes at Home number on file [phone] (home)

## 2023-08-18 RX ORDER — GABAPENTIN 300 MG/1
300 CAPSULE ORAL DAILY
Qty: 90 CAPSULE | Refills: 0 | Status: SHIPPED | OUTPATIENT
Start: 2023-08-18 | End: 2023-11-14

## 2023-08-18 RX ORDER — PREDNISONE 10 MG/1
TABLET ORAL
Qty: 42 TABLET | Refills: 0 | Status: SHIPPED | OUTPATIENT
Start: 2023-08-18 | End: 2023-10-17

## 2023-09-12 ENCOUNTER — APPOINTMENT (OUTPATIENT)
Dept: ULTRASOUND IMAGING | Facility: CLINIC | Age: 78
End: 2023-09-12
Attending: EMERGENCY MEDICINE
Payer: COMMERCIAL

## 2023-09-12 ENCOUNTER — HOSPITAL ENCOUNTER (EMERGENCY)
Facility: CLINIC | Age: 78
Discharge: HOME | End: 2023-09-12
Attending: EMERGENCY MEDICINE | Admitting: EMERGENCY MEDICINE
Payer: COMMERCIAL

## 2023-09-12 VITALS
SYSTOLIC BLOOD PRESSURE: 144 MMHG | HEART RATE: 57 BPM | RESPIRATION RATE: 18 BRPM | TEMPERATURE: 96.8 F | OXYGEN SATURATION: 99 % | DIASTOLIC BLOOD PRESSURE: 68 MMHG

## 2023-09-12 DIAGNOSIS — I82.411 ACUTE DEEP VEIN THROMBOSIS (DVT) OF FEMORAL VEIN OF RIGHT LOWER EXTREMITY (H): ICD-10-CM

## 2023-09-12 LAB
ANION GAP SERPL CALCULATED.3IONS-SCNC: 11 MMOL/L (ref 7–15)
BASOPHILS # BLD AUTO: 0 10E3/UL (ref 0–0.2)
BASOPHILS NFR BLD AUTO: 0 %
BUN SERPL-MCNC: 33.9 MG/DL (ref 8–23)
CALCIUM SERPL-MCNC: 9.3 MG/DL (ref 8.8–10.2)
CHLORIDE SERPL-SCNC: 104 MMOL/L (ref 98–107)
CREAT SERPL-MCNC: 1.03 MG/DL (ref 0.51–0.95)
DEPRECATED HCO3 PLAS-SCNC: 25 MMOL/L (ref 22–29)
EGFRCR SERPLBLD CKD-EPI 2021: 56 ML/MIN/1.73M2
EOSINOPHIL # BLD AUTO: 0.2 10E3/UL (ref 0–0.7)
EOSINOPHIL NFR BLD AUTO: 2 %
ERYTHROCYTE [DISTWIDTH] IN BLOOD BY AUTOMATED COUNT: 13.9 % (ref 10–15)
GLUCOSE SERPL-MCNC: 96 MG/DL (ref 70–99)
HCT VFR BLD AUTO: 44.4 % (ref 35–47)
HGB BLD-MCNC: 14.6 G/DL (ref 11.7–15.7)
IMM GRANULOCYTES # BLD: 0.1 10E3/UL
IMM GRANULOCYTES NFR BLD: 1 %
LYMPHOCYTES # BLD AUTO: 1.4 10E3/UL (ref 0.8–5.3)
LYMPHOCYTES NFR BLD AUTO: 14 %
MCH RBC QN AUTO: 33.4 PG (ref 26.5–33)
MCHC RBC AUTO-ENTMCNC: 32.9 G/DL (ref 31.5–36.5)
MCV RBC AUTO: 102 FL (ref 78–100)
MONOCYTES # BLD AUTO: 0.6 10E3/UL (ref 0–1.3)
MONOCYTES NFR BLD AUTO: 6 %
NEUTROPHILS # BLD AUTO: 7.6 10E3/UL (ref 1.6–8.3)
NEUTROPHILS NFR BLD AUTO: 77 %
NRBC # BLD AUTO: 0 10E3/UL
NRBC BLD AUTO-RTO: 0 /100
PLATELET # BLD AUTO: 170 10E3/UL (ref 150–450)
POTASSIUM SERPL-SCNC: 4.7 MMOL/L (ref 3.4–5.3)
RBC # BLD AUTO: 4.37 10E6/UL (ref 3.8–5.2)
SODIUM SERPL-SCNC: 140 MMOL/L (ref 136–145)
TSH SERPL DL<=0.005 MIU/L-ACNC: 1.02 UIU/ML (ref 0.3–4.2)
WBC # BLD AUTO: 9.8 10E3/UL (ref 4–11)

## 2023-09-12 PROCEDURE — 99284 EMERGENCY DEPT VISIT MOD MDM: CPT | Mod: 25

## 2023-09-12 PROCEDURE — 85025 COMPLETE CBC W/AUTO DIFF WBC: CPT | Performed by: EMERGENCY MEDICINE

## 2023-09-12 PROCEDURE — 93971 EXTREMITY STUDY: CPT | Mod: RT

## 2023-09-12 PROCEDURE — 80048 BASIC METABOLIC PNL TOTAL CA: CPT | Performed by: EMERGENCY MEDICINE

## 2023-09-12 PROCEDURE — 250N000013 HC RX MED GY IP 250 OP 250 PS 637: Performed by: EMERGENCY MEDICINE

## 2023-09-12 PROCEDURE — 84443 ASSAY THYROID STIM HORMONE: CPT | Performed by: EMERGENCY MEDICINE

## 2023-09-12 PROCEDURE — 36415 COLL VENOUS BLD VENIPUNCTURE: CPT | Performed by: EMERGENCY MEDICINE

## 2023-09-12 RX ADMIN — RIVAROXABAN 15 MG: 15 TABLET, FILM COATED ORAL at 18:40

## 2023-09-12 ASSESSMENT — ACTIVITIES OF DAILY LIVING (ADL)
ADLS_ACUITY_SCORE: 35
ADLS_ACUITY_SCORE: 35

## 2023-09-12 NOTE — DISCHARGE INSTRUCTIONS
Stop aspirin and start Xarelto, next dose tomorrow morning. Follow up closely with primary clinic. Return to the ER for worsening symptoms, including chest pain, difficulty breathing, passing out, weakness/numbness or coolness to right leg.

## 2023-09-12 NOTE — ED TRIAGE NOTES
Pt reports yesterday that her right leg started swelling. Swelling occurs from right groin towards foot. Denies any pain. HX of blood clots.      Triage Assessment       Row Name 09/12/23 1251       Triage Assessment (Adult)    Airway WDL WDL       Respiratory WDL    Respiratory WDL WDL       Skin Circulation/Temperature WDL    Skin Circulation/Temperature WDL WDL       Cardiac WDL    Cardiac WDL WDL       Peripheral/Neurovascular WDL    Peripheral Neurovascular WDL WDL       Cognitive/Neuro/Behavioral WDL    Cognitive/Neuro/Behavioral WDL WDL

## 2023-09-12 NOTE — ED PROVIDER NOTES
Emergency Department Encounter     Evaluation Date & Time:   9/12/2023  3:40 PM    CHIEF COMPLAINT:  Leg Swelling      Triage Note:Pt reports yesterday that her right leg started swelling. Swelling occurs from right groin towards foot. Denies any pain. HX of blood clots.                  ED COURSE & MEDICAL DECISION MAKING:     ED Course as of 09/12/23 2251   Tue Sep 12, 2023   1355 I met with the patient for initial interview.     1650 US showing DVT from common femoral vein to popliteal. Will discuss with IR.  Pt has no real pain and intact pulses/strength/sensation with distal leg warm/well perfused, but a fairly extensive DVT.   1701 I spoke with IR regarding US.  Given pt has no real pain, intact pulses, there would be no indication for intervention at this point. Recommends anticoagulation and if worsening despite this over next 48 hours, would need return precautions.     1730 Pharmacy requesting renal function prior to starting Xarelto.  Will send chemistry, CBC and pt asking to have TSH sent as well, so labs sent.     1857 I rechecked and updated the patient with results and plan for discharge. Patient is agreeable. We discussed return precautions and all questions were answered.      Pt with a history of Factor V Leiden, here with right leg swelling worsening for the past 2 days or so.  Pt denies a prior DVT/PE history and no recent travel/surgery. She denies ever being anticoagulated and has no cp/sob to suggest PE.  She's otherwise well and exam is certainly concerning for DVT of right LE with significantly increased swelling compared to left.  No pain/tenderness to leg, intact strength/sensation and pulses. Will get US, reassess.    Medical Decision Making    History:  Supplemental history from: Family Member/Significant Other  External Record(s) reviewed: Documented in chart, if applicable.    Work Up:  Chart documentation includes differential considered and any EKGs or imaging independently  interpreted by provider, where specified.  In additional to work up documented, I considered the following work up: Documented in chart, if applicable.    External consultation:  Discussion of management with another provider: Documented in chart, if applicable    Complicating factors:  Care impacted by chronic illness: Other: Factor V Leiden  Care affected by social determinants of health: N/A    Disposition considerations: Discharge. I prescribed additional prescription strength medication(s) as charted. I considered admission, but ultimately discharged patient based on workup, outpatient plan.      At the conclusion of the encounter I discussed the results of all the tests and the disposition. The questions were answered. The patient or family acknowledged understanding and was agreeable with the care plan.      MEDICATIONS GIVEN IN THE EMERGENCY DEPARTMENT:  Medications   rivaroxaban ANTICOAGULANT (XARELTO) tablet 15 mg (15 mg Oral $Given 9/12/23 1840)       NEW PRESCRIPTIONS STARTED AT TODAY'S ED VISIT:  Discharge Medication List as of 9/12/2023  6:43 PM        START taking these medications    Details   Rivaroxaban ANTICOAGULANT 15 & 20 MG TBPK Starter Therapy Pack Take 15 mg by mouth 2 times daily (with meals) for 21 days, THEN 20 mg daily with food for 9 days., Disp-51 each, R-0, E-Prescribe             HPI   HPI     Roula Gomez is a 77 year old female with a pertinent history of clotting disorder who presents to this ED by private car for evaluation of right leg swelling.    Patient reports right leg swelling that initially started that started yesterday. She noticed increased swelling last night and this morning. Patient said her lower right leg and top of foot have been swollen before due to humility or if she's on it for too long. Patient states that she never had swelling that went up to her groin. Patient is not on any blood thinners but has a history of blood clots.   Patient denies recent travels,  surgeries, other swelling, and other acute symptoms.     REVIEW OF SYSTEMS:  See HPI      Medical History     Past Medical History:   Diagnosis Date    Clotting disorder (H)        Past Surgical History:   Procedure Laterality Date    APPENDECTOMY      CHOLECYSTECTOMY      COLONOSCOPY      LITHOTRIPSY      x2    OVARIAN CYST REMOVAL      TONSILLECTOMY      TUBAL LIGATION         Family History   Problem Relation Age of Onset    Breast Cancer Mother     Colon Cancer Father        Social History     Tobacco Use    Smoking status: Every Day     Packs/day: 0.50     Types: Cigarettes    Smokeless tobacco: Never   Substance Use Topics    Alcohol use: Never    Drug use: Never       Rivaroxaban ANTICOAGULANT 15 & 20 MG TBPK Starter Therapy Pack  aspirin 325 MG tablet  clobetasol (TEMOVATE) 0.05 % external cream  gabapentin (NEURONTIN) 300 MG capsule  ketorolac (ACULAR) 0.5 % ophthalmic solution  magnesium oxide (MAG-OX) 400 mg (241.3 mg magnesium) tablet  ofloxacin (OCUFLOX) 0.3 % ophthalmic solution  prednisoLONE acetate (PRED FORTE) 1 % ophthalmic suspension  predniSONE (DELTASONE) 10 MG tablet        Physical Exam     Vitals:  BP (!) 144/68   Pulse 57   Temp 96.8  F (36  C) (Temporal)   Resp 18   SpO2 99%     PHYSICAL EXAM:   Physical Exam  Vitals and nursing note reviewed.   Constitutional:       General: She is not in acute distress.     Appearance: Normal appearance.   HENT:      Head: Normocephalic and atraumatic.      Nose: Nose normal.      Mouth/Throat:      Mouth: Mucous membranes are moist.   Eyes:      Pupils: Pupils are equal, round, and reactive to light.   Neck:      Vascular: No JVD.   Cardiovascular:      Rate and Rhythm: Normal rate and regular rhythm.      Pulses: Normal pulses.           Radial pulses are 2+ on the right side and 2+ on the left side.        Dorsalis pedis pulses are 2+ on the right side and 2+ on the left side.   Pulmonary:      Effort: Pulmonary effort is normal. No respiratory  distress.      Breath sounds: Normal breath sounds.   Abdominal:      Palpations: Abdomen is soft.      Tenderness: There is no abdominal tenderness.   Musculoskeletal:      Cervical back: Full passive range of motion without pain and neck supple.      Comments: right leg significantly more swollen than the left. Swelling extends from foot to mid thigh. Non tender.  Extremity warm and well perfused with 5/5 strength, sensation intact.   Skin:     General: Skin is warm.      Findings: Erythema (mild) present. No rash.   Neurological:      General: No focal deficit present.      Mental Status: She is alert. Mental status is at baseline.      Comments: Fluent speech, no acute lateralizing deficits   Psychiatric:         Mood and Affect: Mood normal.         Behavior: Behavior normal.         Results     LAB:  All pertinent labs reviewed and interpreted  Labs Ordered and Resulted from Time of ED Arrival to Time of ED Departure   BASIC METABOLIC PANEL - Abnormal       Result Value    Sodium 140      Potassium 4.7      Chloride 104      Carbon Dioxide (CO2) 25      Anion Gap 11      Urea Nitrogen 33.9 (*)     Creatinine 1.03 (*)     Calcium 9.3      Glucose 96      GFR Estimate 56 (*)    CBC WITH PLATELETS AND DIFFERENTIAL - Abnormal    WBC Count 9.8      RBC Count 4.37      Hemoglobin 14.6      Hematocrit 44.4       (*)     MCH 33.4 (*)     MCHC 32.9      RDW 13.9      Platelet Count 170      % Neutrophils 77      % Lymphocytes 14      % Monocytes 6      % Eosinophils 2      % Basophils 0      % Immature Granulocytes 1      NRBCs per 100 WBC 0      Absolute Neutrophils 7.6      Absolute Lymphocytes 1.4      Absolute Monocytes 0.6      Absolute Eosinophils 0.2      Absolute Basophils 0.0      Absolute Immature Granulocytes 0.1      Absolute NRBCs 0.0     TSH WITH FREE T4 REFLEX - Normal    TSH 1.02         RADIOLOGY:  US Lower Extremity Venous Duplex Right   Final Result   IMPRESSION:      1.  Acute DVT in the  right lower extremity from the common femoral vein through the popliteal vein.   2.  Superficial thrombophlebitis of the greater saphenous vein in the thigh.   3.  Popliteal fossa cyst.                   ECG:  none    PROCEDURES:  Procedures:  none      FINAL IMPRESSION:    ICD-10-CM    1. Acute deep vein thrombosis (DVT) of femoral vein of right lower extremity (H)  I82.411           0 minutes of critical care time      I, Collette Hancock, am serving as a scribe to document services personally performed by Dr. Matt Cuadra, based on my observations and the provider's statements to me. I, Matt Cuadra, DO attest that Collette Hancock is acting in a scribe capacity, has observed my performance of the services and has documented them in accordance with my direction.      Matt Cuadra DO  Emergency Medicine  [facility] EMERGENCY ROOM  9/12/2023  4:50 PM            Matt Cuadra MD  09/12/23 4345

## 2023-10-17 ENCOUNTER — OFFICE VISIT (OUTPATIENT)
Dept: FAMILY MEDICINE | Facility: CLINIC | Age: 78
End: 2023-10-17
Payer: COMMERCIAL

## 2023-10-17 VITALS
DIASTOLIC BLOOD PRESSURE: 78 MMHG | WEIGHT: 207 LBS | OXYGEN SATURATION: 98 % | RESPIRATION RATE: 18 BRPM | HEIGHT: 61 IN | BODY MASS INDEX: 39.08 KG/M2 | HEART RATE: 78 BPM | SYSTOLIC BLOOD PRESSURE: 145 MMHG | TEMPERATURE: 98.5 F
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
## 2023-10-17 DIAGNOSIS — D68.51 FACTOR V LEIDEN (H): ICD-10-CM

## 2023-10-17 DIAGNOSIS — I83.893 VARICOSE VEINS OF BOTH LEGS WITH EDEMA: ICD-10-CM

## 2023-10-17 DIAGNOSIS — I82.411 ACUTE DEEP VEIN THROMBOSIS (DVT) OF FEMORAL VEIN OF RIGHT LOWER EXTREMITY (H): Primary | ICD-10-CM

## 2023-10-17 PROCEDURE — 99214 OFFICE O/P EST MOD 30 MIN: CPT | Performed by: FAMILY MEDICINE

## 2023-10-17 RX ORDER — PREDNISONE 10 MG/1
TABLET ORAL
Qty: 42 TABLET | Refills: 0 | Status: SHIPPED | OUTPATIENT
Start: 2023-10-17 | End: 2024-04-07

## 2023-10-17 NOTE — PROGRESS NOTES
"  1. Acute deep vein thrombosis (DVT) of femoral vein of right lower extremity (H)  This is a 77 yo female with acute DVT - right femoral vein.  Leg is still swollen, but less so (by patient report).  Discussed need for Rivaroxaban.  Will refer to vascular medicine as well.    - rivaroxaban ANTICOAGULANT (XARELTO) 20 MG TABS tablet; Take 1 tablet (20 mg) by mouth daily (with dinner)  Dispense: 90 tablet; Refill: 1  - Vascular Medicine Referral; Future    2. Varicose veins of both legs with edema  Also has varicose veins - both legs with edema; discussed compression stockings..  Painful.  Refer to vascular medicine.    - Vascular Medicine Referral; Future    3. Factor V Leiden (H24)  Patient has underlying Factor V Leiden - I suspect she needs clot management for long term - patient is opposed to this.    - rivaroxaban ANTICOAGULANT (XARELTO) 20 MG TABS tablet; Take 1 tablet (20 mg) by mouth daily (with dinner)  Dispense: 90 tablet; Refill: 1  - Vascular Medicine Referral; Future    4. Insect bite, unspecified site, initial encounter  Patient also has recurring rash - erythema nodosum - keeps prednisone on hand to treat as needed.    - predniSONE (DELTASONE) 10 MG tablet; 6 tabs po daily x 2 d, 5 tabs po daily x 2 d, 4 tabs po daily x 2 d,  3 tabs po daily x 2 d,  2 tabs po daily x 2 d,  1 tab po daily x 2 d.  Dispense: 42 tablet; Refill: 0      Subjective   Roula is a 78 year old, presenting for the following health issues:  ER F/U and Recheck Medication (Discuss Xarelto medication)        10/17/2023     9:49 AM   Additional Questions   Roomed by Margie       Quit smoking x 6 months - woke up one morning in spring and decided she needed to smoke - \"I need help\"  Lost lots of weight - now gained it all back  Working 0.8 at [employer] [employer] - full outbreak of COVID       Last Thursday - knee and leg was acting up -   Had DVT   Wasn't taking ASA daily - has Factor V leiden     Right leg was super swollen - " "  Went to [facility] - had femoral DVT  On Xarelto - 30 mg daily; now starting 20 mg daily -   Out of it for 4 days -     Wears compression stockings to work   Balance is poor   Got new tennis shoes -   Gets horrible cramps in legs - 3 am     Gets occasional erythema nodosum    Had TSH/t4  Thyroid enlargement - left side (declines imaging - \"comes and goes\")      Had full thickness tear left rotator cuff - 2 surgeries - never really helped -  Getting deep divots in shoulder -   Also has pain in right rotator cuff - declined surgery   Breasts are so heavy -                Review of Systems   Constitutional:  Negative for chills and fever.   HENT:  Negative for congestion, ear pain and sore throat.    Eyes:  Negative for pain and visual disturbance.   Respiratory:  Negative for cough and shortness of breath.    Cardiovascular:  Negative for chest pain, palpitations and peripheral edema.   Gastrointestinal:  Negative for abdominal pain, constipation and diarrhea.   Endocrine: Negative for polyuria.   Genitourinary:  Negative for dysuria, frequency and vaginal discharge.   Musculoskeletal:  Negative for arthralgias and myalgias.        Right leg swelling   Varicose veins bilateral lower extremities   Skin:  Negative for rash.   Allergic/Immunologic: Negative.    Neurological:  Negative for dizziness, weakness, headaches and paresthesias.   Hematological:  Does not bruise/bleed easily.   Psychiatric/Behavioral: Negative.     All other systems reviewed and are negative.           Objective    BP (!) 145/78 (BP Location: Left arm, Patient Position: Sitting, Cuff Size: Adult Regular)   Pulse 78   Temp 98.5  F (36.9  C) (Temporal)   Resp 18   Ht 1.55 m (5' 1.02\")   Wt 93.9 kg (207 lb)   LMP  (LMP Unknown)   SpO2 98%   BMI 39.08 kg/m    Body mass index is 39.08 kg/m .  Physical Exam  Vitals reviewed.   Constitutional:       General: She is not in acute distress.     Appearance: Normal appearance.   HENT:      Head: " Normocephalic.      Right Ear: Tympanic membrane, ear canal and external ear normal.      Left Ear: Tympanic membrane, ear canal and external ear normal.      Nose: Nose normal.      Mouth/Throat:      Mouth: Mucous membranes are moist.      Pharynx: No posterior oropharyngeal erythema.   Eyes:      Extraocular Movements: Extraocular movements intact.      Conjunctiva/sclera: Conjunctivae normal.      Pupils: Pupils are equal, round, and reactive to light.   Cardiovascular:      Rate and Rhythm: Normal rate and regular rhythm.      Pulses: Normal pulses.      Heart sounds: Normal heart sounds. No murmur heard.  Pulmonary:      Effort: Pulmonary effort is normal.      Breath sounds: Normal breath sounds.   Abdominal:      Palpations: Abdomen is soft. There is no mass.      Tenderness: There is no abdominal tenderness. There is no guarding or rebound.   Musculoskeletal:         General: No deformity. Normal range of motion.      Cervical back: Normal range of motion and neck supple.      Right lower leg: Edema present.   Lymphadenopathy:      Cervical: No cervical adenopathy.   Skin:     General: Skin is warm and dry.   Neurological:      General: No focal deficit present.      Mental Status: She is alert.   Psychiatric:         Mood and Affect: Mood normal.         Behavior: Behavior normal.            No results found for any visits on 10/17/23.          Prior to immunization administration, verified patients identity using patient s name and date of birth. Please see Immunization Activity for additional information.     Screening Questionnaire for Adult Immunization    Are you sick today?   No   Do you have allergies to medications, food, a vaccine component or latex?   Yes   Have you ever had a serious reaction after receiving a vaccination?   No   Do you have a long-term health problem with heart, lung, kidney, or metabolic disease (e.g., diabetes), asthma, a blood disorder, no spleen, complement component  deficiency, a cochlear implant, or a spinal fluid leak?  Are you on long-term aspirin therapy?   Yes   Do you have cancer, leukemia, HIV/AIDS, or any other immune system problem?   No   Do you have a parent, brother, or sister with an immune system problem?   Yes   In the past 3 months, have you taken medications that affect  your immune system, such as prednisone, other steroids, or anticancer drugs; drugs for the treatment of rheumatoid arthritis, Crohn s disease, or psoriasis; or have you had radiation treatments?   Yes   Have you had a seizure, or a brain or other nervous system problem?   No   During the past year, have you received a transfusion of blood or blood    products, or been given immune (gamma) globulin or antiviral drug?   No   For women: Are you pregnant or is there a chance you could become       pregnant during the next month?   No   Have you received any vaccinations in the past 4 weeks?   No     Immunization questionnaire was positive for at least one answer.  Notified Dr. Boo.      Patient instructed to remain in clinic for 15 minutes afterwards, and to report any adverse reactions.     Screening performed by Margie Sloan CMA on 10/17/2023 at 9:51 AM.

## 2023-10-22 ASSESSMENT — ENCOUNTER SYMPTOMS
SORE THROAT: 0
BRUISES/BLEEDS EASILY: 0
CHILLS: 0
PSYCHIATRIC NEGATIVE: 1
ARTHRALGIAS: 0
FREQUENCY: 0
COUGH: 0
PARESTHESIAS: 0
PALPITATIONS: 0
EYE PAIN: 0
ABDOMINAL PAIN: 0
DYSURIA: 0
SHORTNESS OF BREATH: 0
WEAKNESS: 0
DIARRHEA: 0
ALLERGIC/IMMUNOLOGIC NEGATIVE: 1
HEADACHES: 0
MYALGIAS: 0
FEVER: 0
DIZZINESS: 0
CONSTIPATION: 0

## 2023-11-14 DIAGNOSIS — G89.29 OTHER CHRONIC PAIN: ICD-10-CM

## 2023-11-14 RX ORDER — GABAPENTIN 300 MG/1
CAPSULE ORAL
Qty: 90 CAPSULE | Refills: 2 | Status: SHIPPED | OUTPATIENT
Start: 2023-11-14 | End: 2024-05-23

## 2023-12-13 DIAGNOSIS — I82.411 ACUTE DEEP VEIN THROMBOSIS (DVT) OF FEMORAL VEIN OF RIGHT LOWER EXTREMITY (H): ICD-10-CM

## 2023-12-13 DIAGNOSIS — D68.51 FACTOR V LEIDEN (H): ICD-10-CM

## 2023-12-13 NOTE — TELEPHONE ENCOUNTER
Medication Question or Refill    Contacts         Type Contact Phone/Fax    12/13/2023 03:56 PM CST Phone (Incoming) Roula Gomez (Self) [phone] (M)            What medication are you calling about (include dose and sig)?: rivaroxaban ANTICOAGULANT (XARELTO) 20 MG TABS tablet     Preferred Pharmacy:   [facility] [id number] [place] [address] AT [place] [address] [address] [facility] [address]  Phone: [phone] Fax: [phone]      Controlled Substance Agreement on file:   CSA -- Patient Level:    CSA: None found at the patient level.       Who prescribed the medication?: Dr. Boo    Do you need a refill? Yes    When did you use the medication last? 12/13/2023    Patient offered an appointment? No    Do you have any questions or concerns?  Yes: patient only has 4 tablets left      Okay to leave a detailed message?: Yes at Home number on file [phone] (home)

## 2024-02-14 ENCOUNTER — TELEPHONE (OUTPATIENT)
Dept: FAMILY MEDICINE | Facility: CLINIC | Age: 79
End: 2024-02-14
Payer: COMMERCIAL

## 2024-02-14 DIAGNOSIS — R05.3 CHRONIC COUGH: Primary | ICD-10-CM

## 2024-02-16 RX ORDER — PREDNISONE 20 MG/1
TABLET ORAL
Qty: 13 TABLET | Refills: 0 | Status: SHIPPED | OUTPATIENT
Start: 2024-02-16 | End: 2024-04-07

## 2024-04-07 ENCOUNTER — HOSPITAL ENCOUNTER (EMERGENCY)
Facility: CLINIC | Age: 79
Discharge: HOME | End: 2024-04-07
Attending: EMERGENCY MEDICINE | Admitting: EMERGENCY MEDICINE
Payer: COMMERCIAL

## 2024-04-07 ENCOUNTER — APPOINTMENT (OUTPATIENT)
Dept: RADIOLOGY | Facility: CLINIC | Age: 79
End: 2024-04-07
Attending: EMERGENCY MEDICINE
Payer: COMMERCIAL

## 2024-04-07 VITALS
WEIGHT: 197 LBS | HEART RATE: 61 BPM | TEMPERATURE: 97.8 F | DIASTOLIC BLOOD PRESSURE: 76 MMHG | BODY MASS INDEX: 36.25 KG/M2 | OXYGEN SATURATION: 98 % | SYSTOLIC BLOOD PRESSURE: 156 MMHG | RESPIRATION RATE: 16 BRPM | HEIGHT: 62 IN

## 2024-04-07 DIAGNOSIS — R42 VERTIGO: ICD-10-CM

## 2024-04-07 DIAGNOSIS — N17.9 ACUTE KIDNEY INJURY (H): ICD-10-CM

## 2024-04-07 DIAGNOSIS — J06.9 VIRAL UPPER RESPIRATORY ILLNESS: ICD-10-CM

## 2024-04-07 LAB
ANION GAP SERPL CALCULATED.3IONS-SCNC: 11 MMOL/L (ref 7–15)
BASOPHILS # BLD AUTO: 0 10E3/UL (ref 0–0.2)
BASOPHILS NFR BLD AUTO: 0 %
BUN SERPL-MCNC: 25.3 MG/DL (ref 8–23)
CALCIUM SERPL-MCNC: 9.8 MG/DL (ref 8.8–10.2)
CHLORIDE SERPL-SCNC: 106 MMOL/L (ref 98–107)
CREAT SERPL-MCNC: 1.77 MG/DL (ref 0.51–0.95)
DEPRECATED HCO3 PLAS-SCNC: 27 MMOL/L (ref 22–29)
EGFRCR SERPLBLD CKD-EPI 2021: 29 ML/MIN/1.73M2
EOSINOPHIL # BLD AUTO: 0.2 10E3/UL (ref 0–0.7)
EOSINOPHIL NFR BLD AUTO: 1 %
ERYTHROCYTE [DISTWIDTH] IN BLOOD BY AUTOMATED COUNT: 13 % (ref 10–15)
FLUAV RNA SPEC QL NAA+PROBE: NEGATIVE
FLUBV RNA RESP QL NAA+PROBE: NEGATIVE
GLUCOSE SERPL-MCNC: 114 MG/DL (ref 70–99)
HCT VFR BLD AUTO: 45.3 % (ref 35–47)
HGB BLD-MCNC: 15.5 G/DL (ref 11.7–15.7)
IMM GRANULOCYTES # BLD: 0 10E3/UL
IMM GRANULOCYTES NFR BLD: 0 %
LYMPHOCYTES # BLD AUTO: 1.6 10E3/UL (ref 0.8–5.3)
LYMPHOCYTES NFR BLD AUTO: 15 %
MCH RBC QN AUTO: 33.5 PG (ref 26.5–33)
MCHC RBC AUTO-ENTMCNC: 34.2 G/DL (ref 31.5–36.5)
MCV RBC AUTO: 98 FL (ref 78–100)
MONOCYTES # BLD AUTO: 1.2 10E3/UL (ref 0–1.3)
MONOCYTES NFR BLD AUTO: 12 %
NEUTROPHILS # BLD AUTO: 7.7 10E3/UL (ref 1.6–8.3)
NEUTROPHILS NFR BLD AUTO: 71 %
NRBC # BLD AUTO: 0 10E3/UL
NRBC BLD AUTO-RTO: 0 /100
PLATELET # BLD AUTO: 200 10E3/UL (ref 150–450)
POTASSIUM SERPL-SCNC: 4.1 MMOL/L (ref 3.4–5.3)
RBC # BLD AUTO: 4.63 10E6/UL (ref 3.8–5.2)
RSV RNA SPEC NAA+PROBE: NEGATIVE
SARS-COV-2 RNA RESP QL NAA+PROBE: NEGATIVE
SODIUM SERPL-SCNC: 144 MMOL/L (ref 135–145)
TROPONIN T SERPL HS-MCNC: 14 NG/L
WBC # BLD AUTO: 10.8 10E3/UL (ref 4–11)

## 2024-04-07 PROCEDURE — 71046 X-RAY EXAM CHEST 2 VIEWS: CPT

## 2024-04-07 PROCEDURE — 250N000013 HC RX MED GY IP 250 OP 250 PS 637: Performed by: EMERGENCY MEDICINE

## 2024-04-07 PROCEDURE — 36415 COLL VENOUS BLD VENIPUNCTURE: CPT | Performed by: EMERGENCY MEDICINE

## 2024-04-07 PROCEDURE — 96361 HYDRATE IV INFUSION ADD-ON: CPT

## 2024-04-07 PROCEDURE — 85025 COMPLETE CBC W/AUTO DIFF WBC: CPT | Performed by: EMERGENCY MEDICINE

## 2024-04-07 PROCEDURE — 250N000011 HC RX IP 250 OP 636: Performed by: EMERGENCY MEDICINE

## 2024-04-07 PROCEDURE — 96374 THER/PROPH/DIAG INJ IV PUSH: CPT

## 2024-04-07 PROCEDURE — 258N000003 HC RX IP 258 OP 636: Performed by: EMERGENCY MEDICINE

## 2024-04-07 PROCEDURE — 80048 BASIC METABOLIC PNL TOTAL CA: CPT | Performed by: EMERGENCY MEDICINE

## 2024-04-07 PROCEDURE — 99284 EMERGENCY DEPT VISIT MOD MDM: CPT | Mod: 25

## 2024-04-07 PROCEDURE — 84484 ASSAY OF TROPONIN QUANT: CPT | Performed by: EMERGENCY MEDICINE

## 2024-04-07 PROCEDURE — 87637 SARSCOV2&INF A&B&RSV AMP PRB: CPT | Performed by: EMERGENCY MEDICINE

## 2024-04-07 RX ORDER — AZITHROMYCIN 250 MG/1
TABLET, FILM COATED ORAL
Qty: 6 TABLET | Refills: 0 | Status: SHIPPED | OUTPATIENT
Start: 2024-04-07

## 2024-04-07 RX ORDER — MECLIZINE HYDROCHLORIDE 25 MG/1
25 TABLET ORAL ONCE
Status: COMPLETED | OUTPATIENT
Start: 2024-04-07 | End: 2024-04-07

## 2024-04-07 RX ORDER — ONDANSETRON 2 MG/ML
4 INJECTION INTRAMUSCULAR; INTRAVENOUS ONCE
Status: COMPLETED | OUTPATIENT
Start: 2024-04-07 | End: 2024-04-07

## 2024-04-07 RX ORDER — MECLIZINE HYDROCHLORIDE 25 MG/1
25 TABLET ORAL 3 TIMES DAILY PRN
Qty: 20 TABLET | Refills: 0 | Status: SHIPPED | OUTPATIENT
Start: 2024-04-07

## 2024-04-07 RX ORDER — PREDNISONE 20 MG/1
TABLET ORAL
Qty: 10 TABLET | Refills: 0 | Status: SHIPPED | OUTPATIENT
Start: 2024-04-07

## 2024-04-07 RX ADMIN — ONDANSETRON 4 MG: 2 INJECTION INTRAMUSCULAR; INTRAVENOUS at 14:54

## 2024-04-07 RX ADMIN — MECLIZINE HYDROCHLORIDE 25 MG: 25 TABLET ORAL at 14:53

## 2024-04-07 RX ADMIN — SODIUM CHLORIDE 1000 ML: 9 INJECTION, SOLUTION INTRAVENOUS at 15:46

## 2024-04-07 ASSESSMENT — ACTIVITIES OF DAILY LIVING (ADL)
ADLS_ACUITY_SCORE: 35
ADLS_ACUITY_SCORE: 35

## 2024-04-07 ASSESSMENT — COLUMBIA-SUICIDE SEVERITY RATING SCALE - C-SSRS
2. HAVE YOU ACTUALLY HAD ANY THOUGHTS OF KILLING YOURSELF IN THE PAST MONTH?: NO
6. HAVE YOU EVER DONE ANYTHING, STARTED TO DO ANYTHING, OR PREPARED TO DO ANYTHING TO END YOUR LIFE?: NO
1. IN THE PAST MONTH, HAVE YOU WISHED YOU WERE DEAD OR WISHED YOU COULD GO TO SLEEP AND NOT WAKE UP?: NO

## 2024-04-07 NOTE — DISCHARGE INSTRUCTIONS
You are seen in the emergency department for concerns about a lingering respiratory illness, chest discomfort, and vertigo.  Your evaluation has appeared generally stable overall.  We discussed that in your lab testing today we did identify a mild acute kidney injury which can be often related to other illnesses or dehydration.  Received some fluids here and we would recommend making sure that you are drinking at least 2 to 3 L of water or Gatorade a day to keep yourself well-hydrated at home.  I think be reasonable given duration of cough and symptoms to proceed as we discussed with some azithromycin and prednisone.  We have discussed potentially additional workup including a brain MRI which we are going to hold off on for now.  If your vertigo does significantly worsen or you certainly if you develop any other extremity weakness, inability to walk, or other immediate concern me to reevaluate you right away to continue additional workup.  We recommend following up as soon as possible with your clinic. They may want to recheck your chemistry panel in the next week or 2 to make sure that your kidney function is improving with increased hydration.

## 2024-04-07 NOTE — ED TRIAGE NOTES
"Pt was sent over from [facility] urgent care for complaints of  vertigo and back/rib pain. PT states she has been sick with a respiratory infection causing her to cough frequently. PT states the cough has improved however the vertigo has not. PT is able to ambulate to triage indecently.    BP (!) 144/84   Pulse 72   Resp 18   Ht 1.575 m (5' 2\")   Wt 89.4 kg (197 lb)   LMP  (LMP Unknown)   SpO2 97%   BMI 36.03 kg/m        Triage Assessment (Adult)       Row Name 04/07/24 1351          Triage Assessment    Airway WDL WDL        Respiratory WDL    Respiratory WDL X;cough     Cough Frequency infrequent        Skin Circulation/Temperature WDL    Skin Circulation/Temperature WDL WDL        Cardiac WDL    Cardiac WDL WDL        Peripheral/Neurovascular WDL    Peripheral Neurovascular WDL WDL        Cognitive/Neuro/Behavioral WDL    Cognitive/Neuro/Behavioral WDL WDL                     "

## 2024-04-07 NOTE — ED PROVIDER NOTES
EMERGENCY DEPARTMENT ENCOUNTER      NAME: Roula Gomez  AGE: 78 year old female  YOB: 1945  MRN: [id number]  EVALUATION DATE & TIME: No admission date for patient encounter.    PCP: Isabella Bolden    ED PROVIDER: Sudheer Gu M.D.      Chief Complaint   Patient presents with    Back Pain    Dizziness       FINAL IMPRESSION:  1. Viral upper respiratory illness    2. Acute kidney injury (H24)    3. Vertigo          ED COURSE & MEDICAL DECISION MAKIN year old female presents to the Emergency Department for evaluation of cough, chest discomfort, and vertigo.  Patient has been sick for almost 2 weeks with cough, increasingly productive.  She has been taking her own Keflex for the last 5 days to try to combat which she is concerned might be a lower respiratory illness.  She notes some associated intermittent left-sided chest discomfort during this time, it is not fully pleuritic.  She has been adherent to the Xarelto that she takes for factor V Leiden and a history of DVT.  She does not have any unilateral leg swelling or pain.  Chest discomfort currently is not a major concern.  Patient has a stable EKG from urgent care and high-sensitivity troponin here is within normal limits ruling out any concern for acute coronary syndrome which also seems exceedingly unlikely by the history.  Chest x-ray is negative for evidence of pneumonia or pneumothorax.  I think PE is exceedingly unlikely given the appropriate anticoagulation as well as her constellation of upper respiratory symptoms.  Patient was not interested in pursuing a pulmonary angiogram.  Patient's vertigo sounds mostly positional.  She was referred here for further evaluation of this.  She has a normal neurological exam including coordination testing.  I had suggested that we proceed with a brain MRI to definitively exclude central process such as stroke.  Patient was not interested in further evaluating this.  She does  say that she has a longstanding history of vertigo, this feels similar and is quite positional and also associated with the respiratory symptoms as above, so with the patient's strong preference to avoid additional imaging, we will be deferring neuroimaging at this time.  Patient received some meclizine as well as some IV fluids.  Other labs were obtained and reviewed and primarily notable for a moderate acute kidney injury, patient has had a bit of a variable creatinine by chart review but was near normal 6 months ago as we did discuss this.  She was given some fluids here to start addressing this and I think it probably is related to the rest of her recent illness symptoms and possibly some decreased oral intake in the setting of vertigo and nausea.  She will try to be more aggressive about her liquid intake moving forward.  Again discussed possibility of further advanced imaging and patient declined.  Patient will plan for close follow-up with her primary doctor.  Return precautions reviewed.      Medical Decision Making  Obtained supplemental history:Supplemental history obtained?: No  Reviewed external records: External records reviewed?: Documented in chart and Outpatient Record: 04/07/2024 [facility] Visit  Care impacted by chronic illness:Other: Factor V Leiden  Care significantly affected by social determinants of health:N/A  Did you consider but not order tests?: Work up considered but not performed and documented in chart, if applicable  Did you interpret images independently?: Independent interpretation of ECG and images noted in documentation, when applicable.  Consultation discussion with other provider:Did you involve another provider (consultant, , pharmacy, etc.)?: No  Discharge. I prescribed additional prescription strength medication(s) as charted. N/A.        MEDICATIONS GIVEN IN THE EMERGENCY:  Medications   meclizine (ANTIVERT) tablet 25 mg (25 mg Oral $Given 4/7/24 5898)    ondansetron (ZOFRAN) injection 4 mg (4 mg Intravenous $Given 4/7/24 7586)   sodium chloride 0.9% BOLUS 1,000 mL (0 mLs Intravenous Stopped 4/7/24 7394)       NEW PRESCRIPTIONS STARTED AT TODAY'S ER VISIT  Discharge Medication List as of 4/7/2024  4:39 PM        START taking these medications    Details   azithromycin (ZITHROMAX Z-RADHA) 250 MG tablet Two tablets on the first day, then one tablet daily for the next 4 days, Disp-6 tablet, R-0, Local Print      meclizine (ANTIVERT) 25 MG tablet Take 1 tablet (25 mg) by mouth 3 times daily as needed for dizziness, Disp-20 tablet, R-0, Local Print                =================================================================    HPI    Patient information was obtained from: the patient    Use of : N/A        Roula Gomez is a 78 year old female with a pertinent history of Factor V Leiden who presents to this ED via private car for evaluation of vertigo and upper respiratory infection symptoms.     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 About one week later, she became sick with upper respiratory infection symptoms and a productive cough. She tested negative for COVID-19 twice and the patient that she took care of also tested negative. Her cough was initially productive with green mucus that later turned brown. Although she is still coughing in the morning, her cough has mostly subsided. The patient notes that she started taking Keflex on 04/02.     She now reports having chest pain radiating into her back in addition to positional vertigo and lightheadedness. The vertigo is exacerbated by sitting or standing up and has not yet resolved. She otherwise denies having leg pain or swelling, a headache or new weakness or numbness. The patient adds she has had these symptoms previously and that taking Z-Radha or Prednisone has helped. Of note, she takes Eliquis for her Factor V  Anneliese.    Per Chart Review, the patient was seen on 04/07/2024 at [facility] for evaluation of a cough and vertigo. It was felt that the patient's vertigo required further work up in the emergency department including an MRI, ultrasound or CT scan. Additionally, she was advised to get a chest CT scan and or D-dimer for her chest pain.        REVIEW OF SYSTEMS   All systems reviewed and negative except as noted in HPI.    PAST MEDICAL HISTORY:  Past Medical History:   Diagnosis Date    Clotting disorder (H24)     Factor V       PAST SURGICAL HISTORY:  Past Surgical History:   Procedure Laterality Date    APPENDECTOMY      CHOLECYSTECTOMY      COLONOSCOPY      LITHOTRIPSY      x2    OVARIAN CYST REMOVAL      TONSILLECTOMY      TUBAL LIGATION             CURRENT MEDICATIONS:    No current facility-administered medications for this encounter.     Current Outpatient Medications   Medication Sig Dispense Refill    azithromycin (ZITHROMAX Z-RADHA) 250 MG tablet Two tablets on the first day, then one tablet daily for the next 4 days 6 tablet 0    meclizine (ANTIVERT) 25 MG tablet Take 1 tablet (25 mg) by mouth 3 times daily as needed for dizziness 20 tablet 0    predniSONE (DELTASONE) 20 MG tablet Take two tablets (= 40mg) each day for 5 (five) days 10 tablet 0    aspirin 325 MG tablet [ASPIRIN 325 MG TABLET] Take 325 mg by mouth. (Patient not taking: Reported on 10/17/2023)      gabapentin (NEURONTIN) 300 MG capsule TAKE 1 CAPSULE(300 MG) BY MOUTH DAILY 90 capsule 2    magnesium oxide (MAG-OX) 400 mg (241.3 mg magnesium) tablet [MAGNESIUM OXIDE (MAG-OX) 400 MG (241.3 MG MAGNESIUM) TABLET] Take 400 mg by mouth daily.      rivaroxaban ANTICOAGULANT (XARELTO) 20 MG TABS tablet Take 1 tablet (20 mg) by mouth daily (with dinner) 90 tablet 1         ALLERGIES:  Allergies   Allergen Reactions    Sulfa (Sulfonamide Antibiotics) [Sulfa Antibiotics] Unknown       FAMILY HISTORY:  Family History   Problem Relation Age  "of Onset    Breast Cancer Mother     Colon Cancer Father        SOCIAL HISTORY:   Social History     Socioeconomic History    Marital status:    Tobacco Use    Smoking status: Every Day     Packs/day: .5     Types: Cigarettes    Smokeless tobacco: Never   Substance and Sexual Activity    Alcohol use: Never    Drug use: Never    Sexual activity: Not Currently     Social Determinants of Health     Financial Resource Strain: Low Risk  (10/17/2023)    Financial Resource Strain     Within the past 12 months, have you or your family members you live with been unable to get utilities (heat, electricity) when it was really needed?: No   Food Insecurity: Low Risk  (10/17/2023)    Food Insecurity     Within the past 12 months, did you worry that your food would run out before you got money to buy more?: No     Within the past 12 months, did the food you bought just not last and you didn t have money to get more?: No   Transportation Needs: Low Risk  (10/17/2023)    Transportation Needs     Within the past 12 months, has lack of transportation kept you from medical appointments, getting your medicines, non-medical meetings or appointments, work, or from getting things that you need?: No   Interpersonal Safety: Low Risk  (10/17/2023)    Interpersonal Safety     Do you feel physically and emotionally safe where you currently live?: Yes     Within the past 12 months, have you been hit, slapped, kicked or otherwise physically hurt by someone?: No     Within the past 12 months, have you been humiliated or emotionally abused in other ways by your partner or ex-partner?: No   Housing Stability: Low Risk  (10/17/2023)    Housing Stability     Do you have housing? : Yes     Are you worried about losing your housing?: No       VITALS:  BP (!) 156/76   Pulse 61   Temp 97.8  F (36.6  C) (Oral)   Resp 16   Ht 1.575 m (5' 2\")   Wt 89.4 kg (197 lb)   LMP  (LMP Unknown)   SpO2 98%   BMI 36.03 kg/m      PHYSICAL EXAM  "   Constitutional: Well developed, Well nourished, NAD.  HENT: Normocephalic, Atraumatic. Neck Supple.  Eyes: EOMI, Conjunctiva normal.  Respiratory: Breathing comfortably on room air. Speaks full sentences easily. Lungs clear to ascultation.  Cardiovascular: Normal heart rate, Regular rhythm. No peripheral edema.  Abdomen: Soft, nontender  Musculoskeletal: Good range of motion in all major joints. No major deformities noted.  Integument: Warm, Dry.  Neurologic: Fully awake, alert, oriented.  Face is symmetric.  Speech is normal.  Cranial nerves II through XII intact.  Strength is 5 out of 5 throughout bilateral upper and lower extremities.  Sensation to light touch intact x4.  Finger-nose-finger testing is normal.  Patient is ambulatory.  Psychiatric: Cooperative. Affect appropriate.     LAB:  All pertinent labs reviewed and interpreted.  Labs Ordered and Resulted from Time of ED Arrival to Time of ED Departure   BASIC METABOLIC PANEL - Abnormal       Result Value    Sodium 144      Potassium 4.1      Chloride 106      Carbon Dioxide (CO2) 27      Anion Gap 11      Urea Nitrogen 25.3 (*)     Creatinine 1.77 (*)     GFR Estimate 29 (*)     Calcium 9.8      Glucose 114 (*)    CBC WITH PLATELETS AND DIFFERENTIAL - Abnormal    WBC Count 10.8      RBC Count 4.63      Hemoglobin 15.5      Hematocrit 45.3      MCV 98      MCH 33.5 (*)     MCHC 34.2      RDW 13.0      Platelet Count 200      % Neutrophils 71      % Lymphocytes 15      % Monocytes 12      % Eosinophils 1      % Basophils 0      % Immature Granulocytes 0      NRBCs per 100 WBC 0      Absolute Neutrophils 7.7      Absolute Lymphocytes 1.6      Absolute Monocytes 1.2      Absolute Eosinophils 0.2      Absolute Basophils 0.0      Absolute Immature Granulocytes 0.0      Absolute NRBCs 0.0     INFLUENZA A/B, RSV, & SARS-COV2 PCR - Normal    Influenza A PCR Negative      Influenza B PCR Negative      RSV PCR Negative      SARS CoV2 PCR Negative     TROPONIN T, HIGH  SENSITIVITY - Normal    Troponin T, High Sensitivity 14         RADIOLOGY:  Reviewed all pertinent imaging. Please see official radiology report.  Chest XR,  PA & LAT   Final Result   IMPRESSION: Negative chest.            I, Jessica Dorantes, am serving as a scribe to document services personally performed by Dr. Sudheer Gu, based on my observation and the provider's statements to me. I, Sudheer Gu MD attest that Jessica Dorantes is acting in a scribe capacity, has observed my performance of the services and has documented them in accordance with my direction.    Sudheer Gu M.D.  Emergency Medicine  [facility] EMERGENCY ROOM  [facility] [address] [phone]  Dept: [phone]       Sudheer Gu MD  04/07/24 1701

## 2024-05-23 DIAGNOSIS — G89.29 OTHER CHRONIC PAIN: ICD-10-CM

## 2024-05-23 RX ORDER — GABAPENTIN 300 MG/1
CAPSULE ORAL
Qty: 90 CAPSULE | Refills: 1 | Status: SHIPPED | OUTPATIENT
Start: 2024-05-23

## 2024-05-24 DIAGNOSIS — I82.411 ACUTE DEEP VEIN THROMBOSIS (DVT) OF FEMORAL VEIN OF RIGHT LOWER EXTREMITY (H): ICD-10-CM

## 2024-05-24 DIAGNOSIS — D68.51 FACTOR V LEIDEN (H): ICD-10-CM

## 2024-05-24 NOTE — TELEPHONE ENCOUNTER
Medication Question or Refill        What medication are you calling about (include dose and sig)?: rivaroxaban ANTICOAGULANT (XARELTO) 20 MG TABS tablet     Preferred Pharmacy:   [id number] [place] [address] AT [place] [address] [address]  [facility] [address]  Phone: [phone] Fax: [phone]      Controlled Substance Agreement on file:   CSA -- Patient Level:    CSA: None found at the patient level.       Who prescribed the medication?: PCP    Do you need a refill? Yes    When did you use the medication last? NA    Patient offered an appointment? No    Do you have any questions or concerns?  No

## 2024-08-07 DIAGNOSIS — D68.51 FACTOR V LEIDEN (H): ICD-10-CM

## 2024-08-07 DIAGNOSIS — I82.411 ACUTE DEEP VEIN THROMBOSIS (DVT) OF FEMORAL VEIN OF RIGHT LOWER EXTREMITY (H): ICD-10-CM

## 2024-08-09 RX ORDER — RIVAROXABAN 20 MG/1
20 TABLET, FILM COATED ORAL
Qty: 90 TABLET | Refills: 0 | Status: SHIPPED | OUTPATIENT
Start: 2024-08-09

## 2024-08-24 ENCOUNTER — APPOINTMENT (OUTPATIENT)
Dept: CT IMAGING | Facility: CLINIC | Age: 79
End: 2024-08-24
Attending: STUDENT IN AN ORGANIZED HEALTH CARE EDUCATION/TRAINING PROGRAM
Payer: COMMERCIAL

## 2024-08-24 ENCOUNTER — HOSPITAL ENCOUNTER (EMERGENCY)
Facility: CLINIC | Age: 79
Discharge: HOME | End: 2024-08-24
Attending: STUDENT IN AN ORGANIZED HEALTH CARE EDUCATION/TRAINING PROGRAM | Admitting: STUDENT IN AN ORGANIZED HEALTH CARE EDUCATION/TRAINING PROGRAM
Payer: COMMERCIAL

## 2024-08-24 VITALS
DIASTOLIC BLOOD PRESSURE: 81 MMHG | OXYGEN SATURATION: 97 % | HEART RATE: 84 BPM | SYSTOLIC BLOOD PRESSURE: 146 MMHG | RESPIRATION RATE: 18 BRPM | WEIGHT: 209.5 LBS | HEIGHT: 62 IN | TEMPERATURE: 97.5 F | BODY MASS INDEX: 38.55 KG/M2

## 2024-08-24 DIAGNOSIS — M54.41 ACUTE RIGHT-SIDED LOW BACK PAIN WITH RIGHT-SIDED SCIATICA: ICD-10-CM

## 2024-08-24 LAB
ALBUMIN SERPL BCG-MCNC: 3.7 G/DL (ref 3.5–5.2)
ALBUMIN UR-MCNC: NEGATIVE MG/DL
ALP SERPL-CCNC: 71 U/L (ref 40–150)
ALT SERPL W P-5'-P-CCNC: 18 U/L (ref 0–50)
ANION GAP SERPL CALCULATED.3IONS-SCNC: 8 MMOL/L (ref 7–15)
APPEARANCE UR: CLEAR
AST SERPL W P-5'-P-CCNC: 28 U/L (ref 0–45)
ATRIAL RATE - MUSE: 51 BPM
BACTERIA #/AREA URNS HPF: ABNORMAL /HPF
BASOPHILS # BLD AUTO: 0 10E3/UL (ref 0–0.2)
BASOPHILS NFR BLD AUTO: 0 %
BILIRUB DIRECT SERPL-MCNC: ABNORMAL MG/DL
BILIRUB SERPL-MCNC: 0.7 MG/DL
BILIRUB UR QL STRIP: NEGATIVE
BUN SERPL-MCNC: 24.4 MG/DL (ref 8–23)
CALCIUM SERPL-MCNC: 8.7 MG/DL (ref 8.8–10.4)
CHLORIDE SERPL-SCNC: 106 MMOL/L (ref 98–107)
COLOR UR AUTO: COLORLESS
CREAT SERPL-MCNC: 1.11 MG/DL (ref 0.51–0.95)
DIASTOLIC BLOOD PRESSURE - MUSE: NORMAL MMHG
EGFRCR SERPLBLD CKD-EPI 2021: 51 ML/MIN/1.73M2
EOSINOPHIL # BLD AUTO: 0.1 10E3/UL (ref 0–0.7)
EOSINOPHIL NFR BLD AUTO: 1 %
ERYTHROCYTE [DISTWIDTH] IN BLOOD BY AUTOMATED COUNT: 13.9 % (ref 10–15)
GLUCOSE SERPL-MCNC: 94 MG/DL (ref 70–99)
GLUCOSE UR STRIP-MCNC: NEGATIVE MG/DL
HCO3 SERPL-SCNC: 26 MMOL/L (ref 22–29)
HCT VFR BLD AUTO: 42.4 % (ref 35–47)
HGB BLD-MCNC: 14 G/DL (ref 11.7–15.7)
HGB UR QL STRIP: ABNORMAL
HOLD SPECIMEN: NORMAL
HOLD SPECIMEN: NORMAL
IMM GRANULOCYTES # BLD: 0.1 10E3/UL
IMM GRANULOCYTES NFR BLD: 1 %
INTERPRETATION ECG - MUSE: NORMAL
KETONES UR STRIP-MCNC: NEGATIVE MG/DL
LEUKOCYTE ESTERASE UR QL STRIP: ABNORMAL
LIPASE SERPL-CCNC: 23 U/L (ref 13–60)
LYMPHOCYTES # BLD AUTO: 2.4 10E3/UL (ref 0.8–5.3)
LYMPHOCYTES NFR BLD AUTO: 25 %
MCH RBC QN AUTO: 33.7 PG (ref 26.5–33)
MCHC RBC AUTO-ENTMCNC: 33 G/DL (ref 31.5–36.5)
MCV RBC AUTO: 102 FL (ref 78–100)
MONOCYTES # BLD AUTO: 0.8 10E3/UL (ref 0–1.3)
MONOCYTES NFR BLD AUTO: 8 %
MUCOUS THREADS #/AREA URNS LPF: PRESENT /LPF
NEUTROPHILS # BLD AUTO: 6.3 10E3/UL (ref 1.6–8.3)
NEUTROPHILS NFR BLD AUTO: 66 %
NITRATE UR QL: NEGATIVE
NRBC # BLD AUTO: 0 10E3/UL
NRBC BLD AUTO-RTO: 0 /100
P AXIS - MUSE: 62 DEGREES
PH UR STRIP: 6.5 [PH] (ref 5–7)
PLATELET # BLD AUTO: 202 10E3/UL (ref 150–450)
POTASSIUM SERPL-SCNC: 5.4 MMOL/L (ref 3.4–5.3)
PR INTERVAL - MUSE: 160 MS
PROT SERPL-MCNC: 6.3 G/DL (ref 6.4–8.3)
QRS DURATION - MUSE: 114 MS
QT - MUSE: 416 MS
QTC - MUSE: 383 MS
R AXIS - MUSE: -56 DEGREES
RBC # BLD AUTO: 4.15 10E6/UL (ref 3.8–5.2)
RBC URINE: 13 /HPF
SODIUM SERPL-SCNC: 140 MMOL/L (ref 135–145)
SP GR UR STRIP: 1.01 (ref 1–1.03)
SQUAMOUS EPITHELIAL: 4 /HPF
SYSTOLIC BLOOD PRESSURE - MUSE: NORMAL MMHG
T AXIS - MUSE: 24 DEGREES
TROPONIN T SERPL HS-MCNC: 14 NG/L
TROPONIN T SERPL HS-MCNC: 16 NG/L
UROBILINOGEN UR STRIP-MCNC: <2 MG/DL
VENTRICULAR RATE- MUSE: 51 BPM
WBC # BLD AUTO: 9.7 10E3/UL (ref 4–11)
WBC URINE: 12 /HPF

## 2024-08-24 PROCEDURE — 80053 COMPREHEN METABOLIC PANEL: CPT | Performed by: STUDENT IN AN ORGANIZED HEALTH CARE EDUCATION/TRAINING PROGRAM

## 2024-08-24 PROCEDURE — 250N000011 HC RX IP 250 OP 636: Performed by: STUDENT IN AN ORGANIZED HEALTH CARE EDUCATION/TRAINING PROGRAM

## 2024-08-24 PROCEDURE — 250N000013 HC RX MED GY IP 250 OP 250 PS 637: Performed by: STUDENT IN AN ORGANIZED HEALTH CARE EDUCATION/TRAINING PROGRAM

## 2024-08-24 PROCEDURE — 93005 ELECTROCARDIOGRAM TRACING: CPT | Performed by: STUDENT IN AN ORGANIZED HEALTH CARE EDUCATION/TRAINING PROGRAM

## 2024-08-24 PROCEDURE — 99285 EMERGENCY DEPT VISIT HI MDM: CPT | Mod: 25

## 2024-08-24 PROCEDURE — 87086 URINE CULTURE/COLONY COUNT: CPT | Performed by: STUDENT IN AN ORGANIZED HEALTH CARE EDUCATION/TRAINING PROGRAM

## 2024-08-24 PROCEDURE — 71275 CT ANGIOGRAPHY CHEST: CPT

## 2024-08-24 PROCEDURE — 36415 COLL VENOUS BLD VENIPUNCTURE: CPT | Performed by: STUDENT IN AN ORGANIZED HEALTH CARE EDUCATION/TRAINING PROGRAM

## 2024-08-24 PROCEDURE — 83690 ASSAY OF LIPASE: CPT | Performed by: STUDENT IN AN ORGANIZED HEALTH CARE EDUCATION/TRAINING PROGRAM

## 2024-08-24 PROCEDURE — 80048 BASIC METABOLIC PNL TOTAL CA: CPT | Performed by: STUDENT IN AN ORGANIZED HEALTH CARE EDUCATION/TRAINING PROGRAM

## 2024-08-24 PROCEDURE — 81003 URINALYSIS AUTO W/O SCOPE: CPT | Performed by: STUDENT IN AN ORGANIZED HEALTH CARE EDUCATION/TRAINING PROGRAM

## 2024-08-24 PROCEDURE — 85025 COMPLETE CBC W/AUTO DIFF WBC: CPT | Performed by: STUDENT IN AN ORGANIZED HEALTH CARE EDUCATION/TRAINING PROGRAM

## 2024-08-24 PROCEDURE — 84484 ASSAY OF TROPONIN QUANT: CPT | Mod: 91 | Performed by: STUDENT IN AN ORGANIZED HEALTH CARE EDUCATION/TRAINING PROGRAM

## 2024-08-24 PROCEDURE — 36415 COLL VENOUS BLD VENIPUNCTURE: CPT | Performed by: EMERGENCY MEDICINE

## 2024-08-24 RX ORDER — OXYCODONE HYDROCHLORIDE 5 MG/1
5 TABLET ORAL ONCE
Status: COMPLETED | OUTPATIENT
Start: 2024-08-24 | End: 2024-08-24

## 2024-08-24 RX ORDER — OXYCODONE HYDROCHLORIDE 5 MG/1
5 TABLET ORAL EVERY 6 HOURS PRN
Qty: 8 TABLET | Refills: 0 | Status: SHIPPED | OUTPATIENT
Start: 2024-08-24 | End: 2024-08-26

## 2024-08-24 RX ORDER — ACETAMINOPHEN 325 MG/1
975 TABLET ORAL ONCE
Status: COMPLETED | OUTPATIENT
Start: 2024-08-24 | End: 2024-08-24

## 2024-08-24 RX ORDER — METHOCARBAMOL 500 MG/1
500 TABLET, FILM COATED ORAL 4 TIMES DAILY PRN
Qty: 16 TABLET | Refills: 0 | Status: SHIPPED | OUTPATIENT
Start: 2024-08-24 | End: 2024-08-28

## 2024-08-24 RX ORDER — IOPAMIDOL 755 MG/ML
75 INJECTION, SOLUTION INTRAVASCULAR ONCE
Status: COMPLETED | OUTPATIENT
Start: 2024-08-24 | End: 2024-08-24

## 2024-08-24 RX ORDER — CYCLOBENZAPRINE HCL 10 MG
5 TABLET ORAL 3 TIMES DAILY PRN
Qty: 9 TABLET | Refills: 0 | Status: SHIPPED | OUTPATIENT
Start: 2024-08-24 | End: 2024-08-30

## 2024-08-24 RX ADMIN — ACETAMINOPHEN 975 MG: 325 TABLET ORAL at 14:35

## 2024-08-24 RX ADMIN — IOPAMIDOL 75 ML: 755 INJECTION, SOLUTION INTRAVENOUS at 15:03

## 2024-08-24 RX ADMIN — OXYCODONE HYDROCHLORIDE 5 MG: 5 TABLET ORAL at 14:35

## 2024-08-24 ASSESSMENT — COLUMBIA-SUICIDE SEVERITY RATING SCALE - C-SSRS
1. IN THE PAST MONTH, HAVE YOU WISHED YOU WERE DEAD OR WISHED YOU COULD GO TO SLEEP AND NOT WAKE UP?: NO
2. HAVE YOU ACTUALLY HAD ANY THOUGHTS OF KILLING YOURSELF IN THE PAST MONTH?: NO
6. HAVE YOU EVER DONE ANYTHING, STARTED TO DO ANYTHING, OR PREPARED TO DO ANYTHING TO END YOUR LIFE?: NO

## 2024-08-24 NOTE — DISCHARGE INSTRUCTIONS
Your workup today in the emergency department not show any dangerous causes of your pain.  your CT of the abdomen pelvis does not show any dangerous problems to explain your symptoms.  We did incidentally see some nodules on your thyroid and you should follow-up in a routine fashion with your primary care doctor about this as you may require an ultrasound to further investigate what is causing these thyroid nodules.  You were given a short prescription of oxycodone for the next few days to take for breakthrough pain but I would recommend scheduled Tylenol.  You may  also try methocarbamol as needed for pain relief.  If you develop fevers, significantly worsening back pain, intractable vomiting or other concerning symptoms please return to the emergency department for repeat evaluation.

## 2024-08-24 NOTE — ED PROVIDER NOTES
EMERGENCY DEPARTMENT ENCOUNTER      NAME: Roula Gomez  AGE: 78 year old female  YOB: 1945  MRN: [id number]  EVALUATION DATE & TIME: No admission date for patient encounter.    PCP: Isabella Bolden    ED PROVIDER: Everardo Gibson MD      Chief Complaint   Patient presents with    Leg Pain    Flank Pain         FINAL IMPRESSION:  1. Acute right-sided low back pain with right-sided sciatica          ED COURSE & MEDICAL DECISION MAKING:    Pertinent Labs & Imaging studies reviewed. (See chart for details)  78 year old female presents to the Emergency Department for evaluation of back pain/flank pain and leg pain    ED Course as of 08/24/24 1810   Sat Aug 24, 2024   1426 Patient is a 78-year-old female with history of DVT and factor V Leiden on Xarelto and known history of kidney stones prior cholecystectomy and appendectomy who presents emergency department for evaluation of right back, flank and leg pain.  Patient first began noticing some right posterior calf pain about a week ago and over the following developed pain radiating up her leg into her lower back and now into her mid back and right flank.  Initially she thought the pain may have been related to sciatica but then began developing persistent right-sided flank and mid thoracic back pain.  Pain is located mostly on the posterior aspect of the leg but does wrap around to the anterior aspect of the lower leg as well.  Occasionally has some paresthesia or neuropathy type feeling but states this is somewhat chronic for her.  Chronic urinary incontinence but no change in this and no saddle anesthesia.  Denies any fevers.  No hematuria or dysuria.  Denies any nausea or vomiting.  No chest pain or anterior abdominal pain.  Has not noticed any lower extremity swelling and has not missed any doses of Xarelto.    Exam patient is well-appearing no acute distress.  She is markedly hypertensive but otherwise hemodynamically stable and afebrile.   Heart is regular rate and rhythm.  2+ radial pulses and 2+ out of DP pulses bilaterally, no appreciable lower extremity edema or erythema, no significant calf tenderness in the right lower extremity on exam.  She is able to ambulate with a steady gait.  Anterior abdomen is soft and nontender.  Does have some reproducible midline thoracic pain in the right sided musculature, no CVA tenderness.  Initial differential clues was not to nephrolithiasis, acute aortic pathology such as dissection or aneurysm, sciatica or musculoskeletal back pain.  Will  also consider possible atypical ACS.  Will obtain blood work, UA as well as a troponin and EKG along with a CTA of the chest abdomen pelvis to evaluate for acute vascular etiology   1450 EKG shows sinus bradycardia with occasional PVC ventricular rate of 51 beats a minute, left anterior fascicular block, CA interval 160 ms,  ms, QTc 383 ms, no ST elevation or acute ischemic ST changes.   1743 Labs reviewed and interpreted myself.  CBC shows normal white count and hemoglobin.  Baseline creatinine.  Borderline elevated potassium at 5.4 but no hyperkalemic EKG changes.  Lipase within normal limits.  Normal LFTs and bilirubin.  Initial troponin borderline elevated at 16 but repeat shows downtrend to 14 and doubt ACS as cause of her symptoms.  Urinalysis is potentially contaminated with 4 squamous cells as well as 13 reds 12 whites.  There are a few bacteria noted but no nitrites.   1745 CT chest abdomen pelvis does not show any aortic pathology or intra-abdominal process to explain the patient's symptoms.  No visualized kidney stones or significant hydronephrosis.  Incidentally several thyroid nodules were noted but this was discussed with the patient and her need for follow-up with her primary care doctor.  However otherwise workup is reassuring and do not believe she requires further emergent evaluation or hospitalization.  Did experience some relief of pain with  oxycodone here in the emergency department will send her home with 2 days worth for breakthrough pain.  She is also benefit from methocarbamol in the past and we will give her a short prescription of this.  Discussed the importance of not taking methocarbamol in conjunction with oxycodone as they can both be sedating.  Otherwise recommend ongoing Tylenol.  Signs symptoms of worsening condition were discussed and return precautions given.  Patient is comfortable discharge and was discharged stable condition     2:12 PM I met and evaluated the patient.       Medical Decision Making  Obtained supplemental history:Supplemental history obtained?: Family Member/Significant Other  Reviewed external records: External records reviewed?: Inpatient Record: 9/12/23 [facility] emergency department   Care impacted by chronic illness:Other: Factor V Leiden  Care significantly affected by social determinants of health:N/A  Did you consider but not order tests?: Work up considered but not performed and documented in chart, if applicable  Did you interpret images independently?: Independent interpretation of ECG and images noted in documentation, when applicable.  Consultation discussion with other provider:Did you involve another provider (consultant, , pharmacy, etc.)?: No  Discharge. I prescribed additional prescription strength medication(s) as charted. I considered admission, but discharged patient after significant clinical improvement.          At the conclusion of the encounter I discussed the results of all of the tests and the disposition. The questions were answered. The patient or family acknowledged understanding and was agreeable with the care plan.     0 minutes of critical care time     MEDICATIONS GIVEN IN THE EMERGENCY:  Medications   acetaminophen (TYLENOL) tablet 975 mg (975 mg Oral $Given 8/24/24 1435)   oxyCODONE (ROXICODONE) tablet 5 mg (5 mg Oral $Given 8/24/24 1435)   iopamidol (ISOVUE-370)  "solution 75 mL (75 mLs Intravenous $Given 8/24/24 2871)       NEW PRESCRIPTIONS STARTED AT TODAY'S ER VISIT  New Prescriptions    CYCLOBENZAPRINE (FLEXERIL) 10 MG TABLET    Take 0.5 tablets (5 mg) by mouth 3 times daily as needed for muscle spasms.    METHOCARBAMOL (ROBAXIN) 500 MG TABLET    Take 1 tablet (500 mg) by mouth 4 times daily as needed for muscle spasms.    OXYCODONE (ROXICODONE) 5 MG TABLET    Take 1 tablet (5 mg) by mouth every 6 hours as needed for pain.          =================================================================    HPI    Patient information was obtained from: Patient    Use of : N/A        Roula Gomez is a 78 year old female with a pertinent history of factor V Leiden and previous DVT who presents to this ED by private car with  for evaluation of leg and flank pain.     Per chart review: Patient was seen on 9/13/23 at [facility] emergency department for evaluation of leg swelling. US showed DVT from common femoral vein to popliteal. Discussed case with IR whom recommended anticoagulation and gave return precautions. Patient was discharged home in good condition with strict f/up instructions.     5-6 days ago, patient reports an onset of some right anterior shin and knee pain that felt as if there was a \"fullness,\" in these regions. As the time went on, patient reports this sensation radiating upwards to the posterior region of her right thigh and into her right hip/buttock region. She report attempting to stretch the region, yet this did not help or reduce the pain/sensations. 3 days ago, patient reports additional pain surfacing in her right flank region that has been constant since the onset. She does endorse that with any sort of movement the pain and sensations throughout the right side of her body become more intense. Additionally, she mentions that upon movement, she does also have some weakness associated with movements.     Patient also " "shares that for the past few days she's felt more thirsty and has therefore been drinking more water. Due to this, she also reports increased urinary frequency. Patient mentions that she does have some urinary incontinence, yet states this is at baseline and has not worsened.     Around the age of 42 (35+ years ago), patient reports having very similar pain/sensations. She describes this pain as having \"frequent pain attacks,\" that for the longest time she was unable to find out what was wrong. However, after some time, she does note that they found a very large kidney stone in her right kidney that had done a lot of damage. She notes that during this time she had 2 lithotripsy procedures and did go into septic shock 2 times. However, she states that since this incident, she has not had to deal with any related symptoms until now.     Patient does also share a history of a DVT in her right leg roughly 1 year ago. Mentions that she was put on Xarelto and has been taking this regularly since then. Denies being able to take NSAIDs, therefore, she has not taken any other pain medication aside from her blood thinner and gabapentin. Denies missing dosages of her Xarelto.    Patient reports her gallbladder was removed in the past. Denies pain in the left side of her body or between her shoulder blades. Denies nausea, vomiting, fever, cough, hematuria, dysuria, abdominal pain, leg swelling, or rashes. Denies any recent falls or related injuries. Reports allergies to Sulfa medications.     Otherwise in normal state of health. No further concerns at this time.     REVIEW OF SYSTEMS   Refer to the HPI    PAST MEDICAL HISTORY:  Past Medical History:   Diagnosis Date    Clotting disorder (H24)     Factor V       PAST SURGICAL HISTORY:  Past Surgical History:   Procedure Laterality Date    APPENDECTOMY      CHOLECYSTECTOMY      COLONOSCOPY      LITHOTRIPSY      x2    OVARIAN CYST REMOVAL      TONSILLECTOMY      TUBAL LIGATION   "           CURRENT MEDICATIONS:    cyclobenzaprine (FLEXERIL) 10 MG tablet  methocarbamol (ROBAXIN) 500 MG tablet  oxyCODONE (ROXICODONE) 5 MG tablet  aspirin 325 MG tablet  azithromycin (ZITHROMAX Z-RADHA) 250 MG tablet  gabapentin (NEURONTIN) 300 MG capsule  magnesium oxide (MAG-OX) 400 mg (241.3 mg magnesium) tablet  meclizine (ANTIVERT) 25 MG tablet  predniSONE (DELTASONE) 20 MG tablet  XARELTO ANTICOAGULANT 20 MG TABS tablet        ALLERGIES:  Allergies   Allergen Reactions    Sulfa (Sulfonamide Antibiotics) [Sulfa Antibiotics] Unknown       FAMILY HISTORY:  Family History   Problem Relation Age of Onset    Breast Cancer Mother     Colon Cancer Father        SOCIAL HISTORY:   Social History     Socioeconomic History    Marital status:    Tobacco Use    Smoking status: Every Day     Current packs/day: 0.50     Types: Cigarettes    Smokeless tobacco: Never   Substance and Sexual Activity    Alcohol use: Never    Drug use: Never    Sexual activity: Not Currently     Social Determinants of Health     Financial Resource Strain: Low Risk  (10/17/2023)    Financial Resource Strain     Within the past 12 months, have you or your family members you live with been unable to get utilities (heat, electricity) when it was really needed?: No   Food Insecurity: Low Risk  (10/17/2023)    Food Insecurity     Within the past 12 months, did you worry that your food would run out before you got money to buy more?: No     Within the past 12 months, did the food you bought just not last and you didn t have money to get more?: No   Transportation Needs: Low Risk  (10/17/2023)    Transportation Needs     Within the past 12 months, has lack of transportation kept you from medical appointments, getting your medicines, non-medical meetings or appointments, work, or from getting things that you need?: No   Interpersonal Safety: Low Risk  (10/17/2023)    Interpersonal Safety     Do you feel physically and emotionally safe where you  "currently live?: Yes     Within the past 12 months, have you been hit, slapped, kicked or otherwise physically hurt by someone?: No     Within the past 12 months, have you been humiliated or emotionally abused in other ways by your partner or ex-partner?: No   Housing Stability: Low Risk  (10/17/2023)    Housing Stability     Do you have housing? : Yes     Are you worried about losing your housing?: No       VITALS:  BP (!) 202/88 (BP Location: Right arm, Patient Position: Semi-Recinos's, Cuff Size: Adult Large)   Pulse 59   Temp 97.5  F (36.4  C) (Temporal)   Resp 19   Ht 1.575 m (5' 2\")   Wt 95 kg (209 lb 8 oz)   LMP  (LMP Unknown)   SpO2 97%   BMI 38.32 kg/m      PHYSICAL EXAM    Constitutional: Well developed, Well nourished, NAD,  HENT: Normocephalic, Atraumatic,  mucous membranes moist,   Neck- trachea midline, No stridor.    Eyes:EOMI, Conjunctiva normal, No discharge.   Respiratory: Normal breath sounds, No respiratory distress, No wheezing.    Cardiovascular: Normal heart rate, Regular rhythm, No murmurs, 2+ dp pulses bilaterally.    Abdominal: Soft, No tenderness, No rebound or guarding.    ,  No CVA tenderness  Musculoskeletal: no deformity or malalignment,   Integument: Warm, Dry, No erythema,  Pain to palpation of the right flank and mid lumbar and thoracic muscles.,  No midline tenderness in the lumbar thoracic spine or step-offs or bruising or rashes  Neurologic: Alert & oriented x 3   Psychiatric: Affect normal, Cooperative.      LAB:  All pertinent labs reviewed and interpreted.  Results for orders placed or performed during the hospital encounter of 08/24/24   CTA Chest Abdomen Pelvis w Contrast    Impression    IMPRESSION:  1.  No acute aortic abnormality.  2.  No additional visualized explanation for patient's symptoms.  3.  Multinodular thyroid, largest on the left measuring up to 2 cm, recommend further evaluation with ultrasound on a nonemergent/outpatient basis.     Extra Green Top " (Lithium Heparin) Tube   Result Value Ref Range    Hold Specimen [facility]    Extra Purple Top Tube   Result Value Ref Range    Hold Specimen [facility]    Basic metabolic panel   Result Value Ref Range    Sodium 140 135 - 145 mmol/L    Potassium 5.4 (H) 3.4 - 5.3 mmol/L    Chloride 106 98 - 107 mmol/L    Carbon Dioxide (CO2) 26 22 - 29 mmol/L    Anion Gap 8 7 - 15 mmol/L    Urea Nitrogen 24.4 (H) 8.0 - 23.0 mg/dL    Creatinine 1.11 (H) 0.51 - 0.95 mg/dL    GFR Estimate 51 (L) >60 mL/min/1.73m2    Calcium 8.7 (L) 8.8 - 10.4 mg/dL    Glucose 94 70 - 99 mg/dL   UA with Microscopic reflex to Culture    Specimen: Urine, Clean Catch   Result Value Ref Range    Color Urine Colorless Colorless, Straw, Light Yellow, Yellow    Appearance Urine Clear Clear    Glucose Urine Negative Negative mg/dL    Bilirubin Urine Negative Negative    Ketones Urine Negative Negative mg/dL    Specific Gravity Urine 1.007 1.001 - 1.030    Blood Urine 0.1 mg/dL (A) Negative    pH Urine 6.5 5.0 - 7.0    Protein Albumin Urine Negative Negative mg/dL    Urobilinogen Urine <2.0 <2.0 mg/dL    Nitrite Urine Negative Negative    Leukocyte Esterase Urine 250 Leda/uL (A) Negative    Bacteria Urine Few (A) None Seen /HPF    Mucus Urine Present (A) None Seen /LPF    RBC Urine 13 (H) <=2 /HPF    WBC Urine 12 (H) <=5 /HPF    Squamous Epithelials Urine 4 (H) <=1 /HPF   CBC with platelets and differential   Result Value Ref Range    WBC Count 9.7 4.0 - 11.0 10e3/uL    RBC Count 4.15 3.80 - 5.20 10e6/uL    Hemoglobin 14.0 11.7 - 15.7 g/dL    Hematocrit 42.4 35.0 - 47.0 %     (H) 78 - 100 fL    MCH 33.7 (H) 26.5 - 33.0 pg    MCHC 33.0 31.5 - 36.5 g/dL    RDW 13.9 10.0 - 15.0 %    Platelet Count 202 150 - 450 10e3/uL    % Neutrophils 66 %    % Lymphocytes 25 %    % Monocytes 8 %    % Eosinophils 1 %    % Basophils 0 %    % Immature Granulocytes 1 %    NRBCs per 100 WBC 0 <1 /100    Absolute Neutrophils 6.3 1.6 - 8.3 10e3/uL    Absolute Lymphocytes 2.4 0.8 - 5.3 10e3/uL     Absolute Monocytes 0.8 0.0 - 1.3 10e3/uL    Absolute Eosinophils 0.1 0.0 - 0.7 10e3/uL    Absolute Basophils 0.0 0.0 - 0.2 10e3/uL    Absolute Immature Granulocytes 0.1 <=0.4 10e3/uL    Absolute NRBCs 0.0 10e3/uL   Result Value Ref Range    Lipase 23 13 - 60 U/L   Hepatic function panel   Result Value Ref Range    Protein Total 6.3 (L) 6.4 - 8.3 g/dL    Albumin 3.7 3.5 - 5.2 g/dL    Bilirubin Total 0.7 <=1.2 mg/dL    Alkaline Phosphatase 71 40 - 150 U/L    AST 28 0 - 45 U/L    ALT 18 0 - 50 U/L    Bilirubin Direct     Result Value Ref Range    Troponin T, High Sensitivity 16 (H) <=14 ng/L   Result Value Ref Range    Troponin T, High Sensitivity 14 <=14 ng/L       RADIOLOGY:  Reviewed all pertinent imaging. Please see official radiology report.  CTA Chest Abdomen Pelvis w Contrast   Final Result   IMPRESSION:   1.  No acute aortic abnormality.   2.  No additional visualized explanation for patient's symptoms.   3.  Multinodular thyroid, largest on the left measuring up to 2 cm, recommend further evaluation with ultrasound on a nonemergent/outpatient basis.             EKG:    Performed at:     Impression: EKG shows sinus bradycardia with occasional PVC ventricular rate of 51 beats a minute, left anterior fascicular block, IA interval 160 ms,  ms, QTc 383 ms, no ST elevation or acute ischemic ST changes.        I have independently reviewed and interpreted the EKG(s) documented above.    PROCEDURES:         [facility] Documentation:   CMS Diagnoses:              I, Susan Rivas, am serving as a scribe to document services personally performed by Everardo Gibson MD based on my observation and the provider's statements to me. I, Everardo Gibson MD, attest that Susan Rivas is acting in a scribe capacity, has observed my performance of the services and has documented them in accordance with my direction.    Everardo Gibson MD  [facility] EMERGENCY ROOM  [address]  [place] [id number] [phone]        Everardo Gibson MD  08/24/24 3111

## 2024-08-24 NOTE — ED NOTES
Discharge RN only as pt was in the lobby duration of her care.  Physical assessment deferred to provider

## 2024-08-24 NOTE — ED TRIAGE NOTES
Patient presents to the ED with right leg pain that started in the calf and has gone up the leg and extends to the back/right kidney area. History of a big kidney stone on the right side but isn't sure why her legs are hurt. No trauma to the areas. Patient also having urinary frequency.

## 2024-08-24 NOTE — Clinical Note
Roula Gomez was seen and treated in our emergency department on 8/24/2024.  She may return to work on 08/26/2024.       If you have any questions or concerns, please don't hesitate to call.      Everardo Gibson MD

## 2024-08-25 LAB — BACTERIA UR CULT: NORMAL

## 2025-05-06 RX ORDER — ASPIRIN 325 MG
325 TABLET ORAL
COMMUNITY
Start: 2025-05-06

## 2025-05-06 NOTE — TELEPHONE ENCOUNTER
Dr. Boo --  Patient called to update her medication chart.   Patient has made these changes on her own accord.    Patient states no longer taking Xarelto and gabapentin.   Patient has resumed taking aspirin 325 mg daily.     AURE GriffinN RN  [facility]
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
## 2025-06-29 NOTE — TELEPHONE ENCOUNTER
Medication Question or Refill        What medication are you calling about (include dose and sig)?: PREDNISONE     Preferred Pharmacy:   [id number]  [place] [address]  [place] [address]  Phone: [phone] Fax: [phone]      Controlled Substance Agreement on file:   CSA -- Patient Level:    CSA: None found at the patient level.       Who prescribed the medication?: PCP    Do you need a refill? Yes    When did you use the medication last? PT JUST SAID THAT ITS BEEN A WHILE, ITS A PERIODIC MEDICATION THAT SHE USES FOR INSECT BITES     Patient offered an appointment? No    Do you have any questions or concerns?  No      Okay to leave a detailed message?: N/A at [employer]

## 2025-07-02 RX ORDER — PREDNISONE 20 MG/1
TABLET ORAL
Qty: 10 TABLET | Refills: 0 | Status: SHIPPED | OUTPATIENT
Start: 2025-07-02

## 2025-08-09 ENCOUNTER — NURSE TRIAGE (OUTPATIENT)
Dept: NURSING | Facility: CLINIC | Age: 80
End: 2025-08-09
Payer: COMMERCIAL
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
## 2025-08-11 ENCOUNTER — TELEPHONE (OUTPATIENT)
Dept: FAMILY MEDICINE | Facility: CLINIC | Age: 80
End: 2025-08-11
Payer: COMMERCIAL

## 2025-08-12 RX ORDER — PREDNISONE 20 MG/1
TABLET ORAL
Qty: 10 TABLET | Refills: 0 | Status: SHIPPED | OUTPATIENT
Start: 2025-08-12

## 2025-08-12 RX ORDER — CEPHALEXIN 500 MG/1
500 CAPSULE ORAL 2 TIMES DAILY
Qty: 14 CAPSULE | Refills: 0 | Status: SHIPPED | OUTPATIENT
Start: 2025-08-12 | End: 2025-08-19

## 2025-08-30 ENCOUNTER — HEALTH MAINTENANCE LETTER (OUTPATIENT)
Age: 80
End: 2025-08-30